# Patient Record
Sex: MALE | Race: WHITE | NOT HISPANIC OR LATINO | Employment: FULL TIME | URBAN - METROPOLITAN AREA
[De-identification: names, ages, dates, MRNs, and addresses within clinical notes are randomized per-mention and may not be internally consistent; named-entity substitution may affect disease eponyms.]

---

## 2020-11-25 ENCOUNTER — TELEMEDICINE (OUTPATIENT)
Dept: FAMILY MEDICINE CLINIC | Facility: CLINIC | Age: 50
End: 2020-11-25

## 2020-11-25 DIAGNOSIS — I10 BENIGN ESSENTIAL HTN: Primary | ICD-10-CM

## 2020-11-25 DIAGNOSIS — Z00.00 LABORATORY EXAMINATION ORDERED AS PART OF A ROUTINE GENERAL MEDICAL EXAMINATION: ICD-10-CM

## 2020-11-25 PROCEDURE — 99203 OFFICE O/P NEW LOW 30 MIN: CPT | Performed by: PHYSICIAN ASSISTANT

## 2020-11-25 RX ORDER — AMLODIPINE BESYLATE 5 MG/1
TABLET ORAL
Qty: 30 TABLET | Refills: 0 | Status: SHIPPED | OUTPATIENT
Start: 2020-11-25 | End: 2020-12-18 | Stop reason: SDUPTHER

## 2020-11-25 NOTE — PROGRESS NOTES
Subjective   Juan M Collins is a 50 y.o. male.   You have chosen to receive care through a telehealth visit.  Do you consent to use a video/audio connection for your medical care today? Yes    History of Present Illness   Juan M Collins 50 y.o. male who presents today for a new patient appointment.    he has a history of There is no problem list on file for this patient.  .  he is here to establish care I reviewed the PFSH recorded today by my MA/LPN staff.   he   He has been feeling tired.  BP has been high for years  + COVID 19---concern change in vision and some h/a;   Test was last Thurs 11-19-20; onset 11-14-20  Fever resolved.   Cough is better; no wheezing or SOA    I cannot check bp d/t COVID +  I see his wife  Situational anxiety  Dad had MM  Mom DM  See if ins covers colon cancer screening.  The following portions of the patient's history were reviewed and updated as appropriate: allergies, current medications, past family history, past medical history, past social history, past surgical history and problem list.    Review of Systems   Constitutional: Negative for activity change, appetite change and unexpected weight change.   HENT: Negative for nosebleeds and trouble swallowing.    Eyes: Positive for visual disturbance. Negative for pain.   Respiratory: Negative for chest tightness, shortness of breath and wheezing.    Cardiovascular: Negative for chest pain and palpitations.   Gastrointestinal: Negative for abdominal pain and blood in stool.   Endocrine: Negative.    Genitourinary: Negative for difficulty urinating and hematuria.   Musculoskeletal: Negative for joint swelling.   Skin: Negative for color change and rash.   Allergic/Immunologic: Negative.    Neurological: Negative for syncope and speech difficulty.   Hematological: Negative for adenopathy.   Psychiatric/Behavioral: Negative for agitation and confusion.   All other systems reviewed and are negative.      Objective   Physical Exam  Vitals  signs and nursing note reviewed.   Constitutional:       General: He is not in acute distress.     Appearance: He is well-developed. He is not diaphoretic.   HENT:      Head: Normocephalic.      Right Ear: External ear normal.      Left Ear: External ear normal.   Eyes:      General: No scleral icterus.     Conjunctiva/sclera: Conjunctivae normal.      Pupils: Pupils are equal, round, and reactive to light.   Neck:      Musculoskeletal: Normal range of motion and neck supple.   Cardiovascular:      Rate and Rhythm: Normal rate.      Heart sounds: No murmur.   Pulmonary:      Effort: Pulmonary effort is normal.   Musculoskeletal: Normal range of motion.   Skin:     General: Skin is warm and dry.      Coloration: Skin is not jaundiced.      Findings: No rash.   Neurological:      General: No focal deficit present.      Mental Status: He is alert and oriented to person, place, and time. Mental status is at baseline.   Psychiatric:         Mood and Affect: Mood normal. Affect is not inappropriate.         Behavior: Behavior normal.         Thought Content: Thought content normal.         Judgment: Judgment normal.         Assessment/Plan   Diagnoses and all orders for this visit:    1. Benign essential HTN (Primary)  -     Comprehensive metabolic panel  -     Lipid panel  -     CBC and Differential  -     TSH  -     Vitamin B12  -     Folate  -     Urinalysis With Microscopic - Urine, Clean Catch  -     T4, Free  -     T3, Free    2. Laboratory examination ordered as part of a routine general medical examination  -     Comprehensive metabolic panel  -     Lipid panel  -     CBC and Differential  -     TSH  -     Vitamin B12  -     Folate  -     Urinalysis With Microscopic - Urine, Clean Catch  -     T4, Free  -     T3, Free        Need to see me next Friday  Get labs  Plan, Juan M Collins, was seen today.  he was seen for HTN and will start medication.  Time spent was 17 minutes

## 2020-11-25 NOTE — PATIENT INSTRUCTIONS
"Hypertension, Adult  High blood pressure (hypertension) is when the force of blood pumping through the arteries is too strong. The arteries are the blood vessels that carry blood from the heart throughout the body. Hypertension forces the heart to work harder to pump blood and may cause arteries to become narrow or stiff. Untreated or uncontrolled hypertension can cause a heart attack, heart failure, a stroke, kidney disease, and other problems.  A blood pressure reading consists of a higher number over a lower number. Ideally, your blood pressure should be below 120/80. The first (\"top\") number is called the systolic pressure. It is a measure of the pressure in your arteries as your heart beats. The second (\"bottom\") number is called the diastolic pressure. It is a measure of the pressure in your arteries as the heart relaxes.  What are the causes?  The exact cause of this condition is not known. There are some conditions that result in or are related to high blood pressure.  What increases the risk?  Some risk factors for high blood pressure are under your control. The following factors may make you more likely to develop this condition:  · Smoking.  · Having type 2 diabetes mellitus, high cholesterol, or both.  · Not getting enough exercise or physical activity.  · Being overweight.  · Having too much fat, sugar, calories, or salt (sodium) in your diet.  · Drinking too much alcohol.  Some risk factors for high blood pressure may be difficult or impossible to change. Some of these factors include:  · Having chronic kidney disease.  · Having a family history of high blood pressure.  · Age. Risk increases with age.  · Race. You may be at higher risk if you are .  · Gender. Men are at higher risk than women before age 45. After age 65, women are at higher risk than men.  · Having obstructive sleep apnea.  · Stress.  What are the signs or symptoms?  High blood pressure may not cause symptoms. Very high " blood pressure (hypertensive crisis) may cause:  · Headache.  · Anxiety.  · Shortness of breath.  · Nosebleed.  · Nausea and vomiting.  · Vision changes.  · Severe chest pain.  · Seizures.  How is this diagnosed?  This condition is diagnosed by measuring your blood pressure while you are seated, with your arm resting on a flat surface, your legs uncrossed, and your feet flat on the floor. The cuff of the blood pressure monitor will be placed directly against the skin of your upper arm at the level of your heart. It should be measured at least twice using the same arm. Certain conditions can cause a difference in blood pressure between your right and left arms.  Certain factors can cause blood pressure readings to be lower or higher than normal for a short period of time:  · When your blood pressure is higher when you are in a health care provider's office than when you are at home, this is called white coat hypertension. Most people with this condition do not need medicines.  · When your blood pressure is higher at home than when you are in a health care provider's office, this is called masked hypertension. Most people with this condition may need medicines to control blood pressure.  If you have a high blood pressure reading during one visit or you have normal blood pressure with other risk factors, you may be asked to:  · Return on a different day to have your blood pressure checked again.  · Monitor your blood pressure at home for 1 week or longer.  If you are diagnosed with hypertension, you may have other blood or imaging tests to help your health care provider understand your overall risk for other conditions.  How is this treated?  This condition is treated by making healthy lifestyle changes, such as eating healthy foods, exercising more, and reducing your alcohol intake. Your health care provider may prescribe medicine if lifestyle changes are not enough to get your blood pressure under control, and  if:  · Your systolic blood pressure is above 130.  · Your diastolic blood pressure is above 80.  Your personal target blood pressure may vary depending on your medical conditions, your age, and other factors.  Follow these instructions at home:  Eating and drinking    · Eat a diet that is high in fiber and potassium, and low in sodium, added sugar, and fat. An example eating plan is called the DASH (Dietary Approaches to Stop Hypertension) diet. To eat this way:  ? Eat plenty of fresh fruits and vegetables. Try to fill one half of your plate at each meal with fruits and vegetables.  ? Eat whole grains, such as whole-wheat pasta, brown rice, or whole-grain bread. Fill about one fourth of your plate with whole grains.  ? Eat or drink low-fat dairy products, such as skim milk or low-fat yogurt.  ? Avoid fatty cuts of meat, processed or cured meats, and poultry with skin. Fill about one fourth of your plate with lean proteins, such as fish, chicken without skin, beans, eggs, or tofu.  ? Avoid pre-made and processed foods. These tend to be higher in sodium, added sugar, and fat.  · Reduce your daily sodium intake. Most people with hypertension should eat less than 1,500 mg of sodium a day.  · Do not drink alcohol if:  ? Your health care provider tells you not to drink.  ? You are pregnant, may be pregnant, or are planning to become pregnant.  · If you drink alcohol:  ? Limit how much you use to:  § 0-1 drink a day for women.  § 0-2 drinks a day for men.  ? Be aware of how much alcohol is in your drink. In the [place] one drink equals one 12 oz bottle of beer (355 mL), one 5 oz glass of wine (148 mL), or one 1½ oz glass of hard liquor (44 mL).  Lifestyle    · Work with your health care provider to maintain a healthy body weight or to lose weight. Ask what an ideal weight is for you.  · Get at least 30 minutes of exercise most days of the week. Activities may include walking, swimming, or biking.  · Include exercise to  strengthen your muscles (resistance exercise), such as Pilates or lifting weights, as part of your weekly exercise routine. Try to do these types of exercises for 30 minutes at least 3 days a week.  · Do not use any products that contain nicotine or tobacco, such as cigarettes, e-cigarettes, and chewing tobacco. If you need help quitting, ask your health care provider.  · Monitor your blood pressure at home as told by your health care provider.  · Keep all follow-up visits as told by your health care provider. This is important.  Medicines  · Take over-the-counter and prescription medicines only as told by your health care provider. Follow directions carefully. Blood pressure medicines must be taken as prescribed.  · Do not skip doses of blood pressure medicine. Doing this puts you at risk for problems and can make the medicine less effective.  · Ask your health care provider about side effects or reactions to medicines that you should watch for.  Contact a health care provider if you:  · Think you are having a reaction to a medicine you are taking.  · Have headaches that keep coming back (recurring).  · Feel dizzy.  · Have swelling in your ankles.  · Have trouble with your vision.  Get help right away if you:  · Develop a severe headache or confusion.  · Have unusual weakness or numbness.  · Feel faint.  · Have severe pain in your chest or abdomen.  · Vomit repeatedly.  · Have trouble breathing.  Summary  · Hypertension is when the force of blood pumping through your arteries is too strong. If this condition is not controlled, it may put you at risk for serious complications.  · Your personal target blood pressure may vary depending on your medical conditions, your age, and other factors. For most people, a normal blood pressure is less than 120/80.  · Hypertension is treated with lifestyle changes, medicines, or a combination of both. Lifestyle changes include losing weight, eating a healthy, low-sodium diet,  exercising more, and limiting alcohol.  This information is not intended to replace advice given to you by your health care provider. Make sure you discuss any questions you have with your health care provider.  Document Revised: 08/28/2019 Document Reviewed: 08/28/2019  [employer] Patient Education © 2020 [employer]

## 2020-12-01 LAB
ALBUMIN SERPL-MCNC: 4.4 G/DL (ref 3.5–5.2)
ALBUMIN/GLOB SERPL: 1.8 G/DL
ALP SERPL-CCNC: 88 U/L (ref 39–117)
ALT SERPL-CCNC: 41 U/L (ref 1–41)
APPEARANCE UR: CLEAR
AST SERPL-CCNC: 22 U/L (ref 1–40)
BACTERIA #/AREA URNS HPF: NORMAL /HPF
BASOPHILS # BLD AUTO: 0.05 10*3/MM3 (ref 0–0.2)
BASOPHILS NFR BLD AUTO: 0.9 % (ref 0–1.5)
BILIRUB SERPL-MCNC: 1.5 MG/DL (ref 0–1.2)
BILIRUB UR QL STRIP: NEGATIVE
BUN SERPL-MCNC: 8 MG/DL (ref 6–20)
BUN/CREAT SERPL: 10.8 (ref 7–25)
CALCIUM SERPL-MCNC: 9.2 MG/DL (ref 8.6–10.5)
CASTS URNS MICRO: NORMAL
CHLORIDE SERPL-SCNC: 98 MMOL/L (ref 98–107)
CHOLEST SERPL-MCNC: 202 MG/DL (ref 0–200)
CO2 SERPL-SCNC: 27.6 MMOL/L (ref 22–29)
COLOR UR: YELLOW
CREAT SERPL-MCNC: 0.74 MG/DL (ref 0.76–1.27)
CRYSTALS URNS MICRO: NORMAL
EOSINOPHIL # BLD AUTO: 0.1 10*3/MM3 (ref 0–0.4)
EOSINOPHIL NFR BLD AUTO: 1.8 % (ref 0.3–6.2)
EPI CELLS #/AREA URNS HPF: NORMAL /HPF
ERYTHROCYTE [DISTWIDTH] IN BLOOD BY AUTOMATED COUNT: 12.5 % (ref 12.3–15.4)
FOLATE SERPL-MCNC: 12.4 NG/ML (ref 4.78–24.2)
GLOBULIN SER CALC-MCNC: 2.5 GM/DL
GLUCOSE SERPL-MCNC: 295 MG/DL (ref 65–99)
GLUCOSE UR QL: ABNORMAL
HCT VFR BLD AUTO: 47.5 % (ref 37.5–51)
HDLC SERPL-MCNC: 57 MG/DL (ref 40–60)
HGB BLD-MCNC: 16.7 G/DL (ref 13–17.7)
HGB UR QL STRIP: NEGATIVE
IMM GRANULOCYTES # BLD AUTO: 0.03 10*3/MM3 (ref 0–0.05)
IMM GRANULOCYTES NFR BLD AUTO: 0.5 % (ref 0–0.5)
KETONES UR QL STRIP: NEGATIVE
LDLC SERPL CALC-MCNC: 121 MG/DL (ref 0–100)
LEUKOCYTE ESTERASE UR QL STRIP: NEGATIVE
LYMPHOCYTES # BLD AUTO: 1.67 10*3/MM3 (ref 0.7–3.1)
LYMPHOCYTES NFR BLD AUTO: 30.1 % (ref 19.6–45.3)
MCH RBC QN AUTO: 32.2 PG (ref 26.6–33)
MCHC RBC AUTO-ENTMCNC: 35.2 G/DL (ref 31.5–35.7)
MCV RBC AUTO: 91.7 FL (ref 79–97)
MONOCYTES # BLD AUTO: 0.36 10*3/MM3 (ref 0.1–0.9)
MONOCYTES NFR BLD AUTO: 6.5 % (ref 5–12)
NEUTROPHILS # BLD AUTO: 3.34 10*3/MM3 (ref 1.7–7)
NEUTROPHILS NFR BLD AUTO: 60.2 % (ref 42.7–76)
NITRITE UR QL STRIP: NEGATIVE
NRBC BLD AUTO-RTO: 0 /100 WBC (ref 0–0.2)
PH UR STRIP: 5.5 [PH] (ref 5–8)
PLATELET # BLD AUTO: 251 10*3/MM3 (ref 140–450)
POTASSIUM SERPL-SCNC: 4.3 MMOL/L (ref 3.5–5.2)
PROT SERPL-MCNC: 6.9 G/DL (ref 6–8.5)
PROT UR QL STRIP: NEGATIVE
RBC # BLD AUTO: 5.18 10*6/MM3 (ref 4.14–5.8)
RBC #/AREA URNS HPF: NORMAL /HPF
SODIUM SERPL-SCNC: 137 MMOL/L (ref 136–145)
SP GR UR: ABNORMAL (ref 1–1.03)
T3FREE SERPL-MCNC: 3.4 PG/ML (ref 2–4.4)
T4 FREE SERPL-MCNC: 1.23 NG/DL (ref 0.93–1.7)
TRIGL SERPL-MCNC: 135 MG/DL (ref 0–150)
TSH SERPL DL<=0.005 MIU/L-ACNC: 3.06 UIU/ML (ref 0.27–4.2)
UROBILINOGEN UR STRIP-MCNC: ABNORMAL MG/DL
VIT B12 SERPL-MCNC: 647 PG/ML (ref 211–946)
VLDLC SERPL CALC-MCNC: 24 MG/DL (ref 5–40)
WBC # BLD AUTO: 5.55 10*3/MM3 (ref 3.4–10.8)
WBC #/AREA URNS HPF: NORMAL /HPF

## 2020-12-03 LAB
C PEPTIDE SERPL-MCNC: 4.7 NG/ML (ref 1.1–4.4)
HBA1C MFR BLD: 10.59 % (ref 4.8–5.6)
WRITTEN AUTHORIZATION: NORMAL

## 2020-12-04 ENCOUNTER — OFFICE VISIT (OUTPATIENT)
Dept: FAMILY MEDICINE CLINIC | Facility: CLINIC | Age: 50
End: 2020-12-04

## 2020-12-04 VITALS
OXYGEN SATURATION: 98 % | DIASTOLIC BLOOD PRESSURE: 100 MMHG | WEIGHT: 207.7 LBS | RESPIRATION RATE: 16 BRPM | HEART RATE: 90 BPM | SYSTOLIC BLOOD PRESSURE: 140 MMHG | HEIGHT: 71 IN | TEMPERATURE: 97.3 F | BODY MASS INDEX: 29.08 KG/M2

## 2020-12-04 DIAGNOSIS — E11.9 NEW ONSET TYPE 2 DIABETES MELLITUS (HCC): ICD-10-CM

## 2020-12-04 DIAGNOSIS — E78.2 HYPERLIPIDEMIA, MIXED: ICD-10-CM

## 2020-12-04 DIAGNOSIS — E55.9 VITAMIN D DEFICIENCY: ICD-10-CM

## 2020-12-04 DIAGNOSIS — I10 BENIGN ESSENTIAL HTN: ICD-10-CM

## 2020-12-04 DIAGNOSIS — E11.65 TYPE 2 DIABETES MELLITUS WITH HYPERGLYCEMIA, WITHOUT LONG-TERM CURRENT USE OF INSULIN (HCC): Primary | ICD-10-CM

## 2020-12-04 PROCEDURE — 90732 PPSV23 VACC 2 YRS+ SUBQ/IM: CPT | Performed by: PHYSICIAN ASSISTANT

## 2020-12-04 PROCEDURE — 90472 IMMUNIZATION ADMIN EACH ADD: CPT | Performed by: PHYSICIAN ASSISTANT

## 2020-12-04 PROCEDURE — 99214 OFFICE O/P EST MOD 30 MIN: CPT | Performed by: PHYSICIAN ASSISTANT

## 2020-12-04 PROCEDURE — 90686 IIV4 VACC NO PRSV 0.5 ML IM: CPT | Performed by: PHYSICIAN ASSISTANT

## 2020-12-04 PROCEDURE — 90471 IMMUNIZATION ADMIN: CPT | Performed by: PHYSICIAN ASSISTANT

## 2020-12-04 RX ORDER — LISINOPRIL 20 MG/1
20 TABLET ORAL DAILY
Qty: 90 TABLET | Refills: 0 | Status: SHIPPED | OUTPATIENT
Start: 2020-12-04 | End: 2020-12-18 | Stop reason: SDUPTHER

## 2020-12-04 RX ORDER — ROSUVASTATIN CALCIUM 10 MG/1
10 TABLET, COATED ORAL DAILY
Qty: 90 TABLET | Refills: 3 | Status: SHIPPED | OUTPATIENT
Start: 2020-12-04 | End: 2021-02-25 | Stop reason: SDUPTHER

## 2020-12-04 RX ORDER — BLOOD-GLUCOSE METER
1 KIT MISCELLANEOUS DAILY
Qty: 1 EACH | Refills: 0 | Status: SHIPPED | OUTPATIENT
Start: 2020-12-04

## 2020-12-04 RX ORDER — METFORMIN HYDROCHLORIDE 500 MG/1
TABLET, EXTENDED RELEASE ORAL
Qty: 120 TABLET | Refills: 5 | Status: SHIPPED | OUTPATIENT
Start: 2020-12-04 | End: 2022-02-17 | Stop reason: SDUPTHER

## 2020-12-04 NOTE — PATIENT INSTRUCTIONS
Diabetes Mellitus and Nutrition, Adult  When you have diabetes (diabetes mellitus), it is very important to have healthy eating habits because your blood sugar (glucose) levels are greatly affected by what you eat and drink. Eating healthy foods in the appropriate amounts, at about the same times every day, can help you:  · Control your blood glucose.  · Lower your risk of heart disease.  · Improve your blood pressure.  · Reach or maintain a healthy weight.  Every person with diabetes is different, and each person has different needs for a meal plan. Your health care provider may recommend that you work with a diet and nutrition specialist (dietitian) to make a meal plan that is best for you. Your meal plan may vary depending on factors such as:  · The calories you need.  · The medicines you take.  · Your weight.  · Your blood glucose, blood pressure, and cholesterol levels.  · Your activity level.  · Other health conditions you have, such as heart or kidney disease.  How do carbohydrates affect me?  Carbohydrates, also called carbs, affect your blood glucose level more than any other type of food. Eating carbs naturally raises the amount of glucose in your blood. Carb counting is a method for keeping track of how many carbs you eat. Counting carbs is important to keep your blood glucose at a healthy level, especially if you use insulin or take certain oral diabetes medicines.  It is important to know how many carbs you can safely have in each meal. This is different for every person. Your dietitian can help you calculate how many carbs you should have at each meal and for each snack.  Foods that contain carbs include:  · Bread, cereal, rice, pasta, and crackers.  · Potatoes and corn.  · Peas, beans, and lentils.  · Milk and yogurt.  · Fruit and juice.  · Desserts, such as cakes, cookies, ice cream, and candy.  How does alcohol affect me?  Alcohol can cause a sudden decrease in blood glucose (hypoglycemia),  "especially if you use insulin or take certain oral diabetes medicines. Hypoglycemia can be a life-threatening condition. Symptoms of hypoglycemia (sleepiness, dizziness, and confusion) are similar to symptoms of having too much alcohol.  If your health care provider says that alcohol is safe for you, follow these guidelines:  · Limit alcohol intake to no more than 1 drink per day for nonpregnant women and 2 drinks per day for men. One drink equals 12 oz of beer, 5 oz of wine, or 1½ oz of hard liquor.  · Do not drink on an empty stomach.  · Keep yourself hydrated with water, diet soda, or unsweetened iced tea.  · Keep in mind that regular soda, juice, and other mixers may contain a lot of sugar and must be counted as carbs.  What are tips for following this plan?    Reading food labels  · Start by checking the serving size on the \"Nutrition Facts\" label of packaged foods and drinks. The amount of calories, carbs, fats, and other nutrients listed on the label is based on one serving of the item. Many items contain more than one serving per package.  · Check the total grams (g) of carbs in one serving. You can calculate the number of servings of carbs in one serving by dividing the total carbs by 15. For example, if a food has 30 g of total carbs, it would be equal to 2 servings of carbs.  · Check the number of grams (g) of saturated and trans fats in one serving. Choose foods that have low or no amount of these fats.  · Check the number of milligrams (mg) of salt (sodium) in one serving. Most people should limit total sodium intake to less than 2,300 mg per day.  · Always check the nutrition information of foods labeled as \"low-fat\" or \"nonfat\". These foods may be higher in added sugar or refined carbs and should be avoided.  · Talk to your dietitian to identify your daily goals for nutrients listed on the label.  Shopping  · Avoid buying canned, premade, or processed foods. These foods tend to be high in fat, sodium, " and added sugar.  · Shop around the outside edge of the grocery store. This includes fresh fruits and vegetables, bulk grains, fresh meats, and fresh dairy.  Cooking  · Use low-heat cooking methods, such as baking, instead of high-heat cooking methods like deep frying.  · Cook using healthy oils, such as olive, canola, or sunflower oil.  · Avoid cooking with butter, cream, or high-fat meats.  Meal planning  · Eat meals and snacks regularly, preferably at the same times every day. Avoid going long periods of time without eating.  · Eat foods high in fiber, such as fresh fruits, vegetables, beans, and whole grains. Talk to your dietitian about how many servings of carbs you can eat at each meal.  · Eat 4-6 ounces (oz) of lean protein each day, such as lean meat, chicken, fish, eggs, or tofu. One oz of lean protein is equal to:  ? 1 oz of meat, chicken, or fish.  ? 1 egg.  ? ¼ cup of tofu.  · Eat some foods each day that contain healthy fats, such as avocado, nuts, seeds, and fish.  Lifestyle  · Check your blood glucose regularly.  · Exercise regularly as told by your health care provider. This may include:  ? 150 minutes of moderate-intensity or vigorous-intensity exercise each week. This could be brisk walking, biking, or water aerobics.  ? Stretching and doing strength exercises, such as yoga or weightlifting, at least 2 times a week.  · Take medicines as told by your health care provider.  · Do not use any products that contain nicotine or tobacco, such as cigarettes and e-cigarettes. If you need help quitting, ask your health care provider.  · Work with a counselor or diabetes educator to identify strategies to manage stress and any emotional and social challenges.  Questions to ask a health care provider  · Do I need to meet with a diabetes educator?  · Do I need to meet with a dietitian?  · What number can I call if I have questions?  · When are the best times to check my blood glucose?  Where to find more  "information:  · American Diabetes Association: diabetes.org  · Academy of Nutrition and Dietetics: www.eatright.org  · National Java of Diabetes and Digestive and Kidney Diseases (NIH): www.niddk.nih.gov  Summary  · A healthy meal plan will help you control your blood glucose and maintain a healthy lifestyle.  · Working with a diet and nutrition specialist (dietitian) can help you make a meal plan that is best for you.  · Keep in mind that carbohydrates (carbs) and alcohol have immediate effects on your blood glucose levels. It is important to count carbs and to use alcohol carefully.  This information is not intended to replace advice given to you by your health care provider. Make sure you discuss any questions you have with your health care provider.  Document Revised: 11/30/2018 Document Reviewed: 01/22/2018  [employer] Patient Education © 2020 [employer]      Hypertension, Adult  High blood pressure (hypertension) is when the force of blood pumping through the arteries is too strong. The arteries are the blood vessels that carry blood from the heart throughout the body. Hypertension forces the heart to work harder to pump blood and may cause arteries to become narrow or stiff. Untreated or uncontrolled hypertension can cause a heart attack, heart failure, a stroke, kidney disease, and other problems.  A blood pressure reading consists of a higher number over a lower number. Ideally, your blood pressure should be below 120/80. The first (\"top\") number is called the systolic pressure. It is a measure of the pressure in your arteries as your heart beats. The second (\"bottom\") number is called the diastolic pressure. It is a measure of the pressure in your arteries as the heart relaxes.  What are the causes?  The exact cause of this condition is not known. There are some conditions that result in or are related to high blood pressure.  What increases the risk?  Some risk factors for high blood pressure are " under your control. The following factors may make you more likely to develop this condition:  · Smoking.  · Having type 2 diabetes mellitus, high cholesterol, or both.  · Not getting enough exercise or physical activity.  · Being overweight.  · Having too much fat, sugar, calories, or salt (sodium) in your diet.  · Drinking too much alcohol.  Some risk factors for high blood pressure may be difficult or impossible to change. Some of these factors include:  · Having chronic kidney disease.  · Having a family history of high blood pressure.  · Age. Risk increases with age.  · Race. You may be at higher risk if you are .  · Gender. Men are at higher risk than women before age 45. After age 65, women are at higher risk than men.  · Having obstructive sleep apnea.  · Stress.  What are the signs or symptoms?  High blood pressure may not cause symptoms. Very high blood pressure (hypertensive crisis) may cause:  · Headache.  · Anxiety.  · Shortness of breath.  · Nosebleed.  · Nausea and vomiting.  · Vision changes.  · Severe chest pain.  · Seizures.  How is this diagnosed?  This condition is diagnosed by measuring your blood pressure while you are seated, with your arm resting on a flat surface, your legs uncrossed, and your feet flat on the floor. The cuff of the blood pressure monitor will be placed directly against the skin of your upper arm at the level of your heart. It should be measured at least twice using the same arm. Certain conditions can cause a difference in blood pressure between your right and left arms.  Certain factors can cause blood pressure readings to be lower or higher than normal for a short period of time:  · When your blood pressure is higher when you are in a health care provider's office than when you are at home, this is called white coat hypertension. Most people with this condition do not need medicines.  · When your blood pressure is higher at home than when you are in a  health care provider's office, this is called masked hypertension. Most people with this condition may need medicines to control blood pressure.  If you have a high blood pressure reading during one visit or you have normal blood pressure with other risk factors, you may be asked to:  · Return on a different day to have your blood pressure checked again.  · Monitor your blood pressure at home for 1 week or longer.  If you are diagnosed with hypertension, you may have other blood or imaging tests to help your health care provider understand your overall risk for other conditions.  How is this treated?  This condition is treated by making healthy lifestyle changes, such as eating healthy foods, exercising more, and reducing your alcohol intake. Your health care provider may prescribe medicine if lifestyle changes are not enough to get your blood pressure under control, and if:  · Your systolic blood pressure is above 130.  · Your diastolic blood pressure is above 80.  Your personal target blood pressure may vary depending on your medical conditions, your age, and other factors.  Follow these instructions at home:  Eating and drinking    · Eat a diet that is high in fiber and potassium, and low in sodium, added sugar, and fat. An example eating plan is called the DASH (Dietary Approaches to Stop Hypertension) diet. To eat this way:  ? Eat plenty of fresh fruits and vegetables. Try to fill one half of your plate at each meal with fruits and vegetables.  ? Eat whole grains, such as whole-wheat pasta, brown rice, or whole-grain bread. Fill about one fourth of your plate with whole grains.  ? Eat or drink low-fat dairy products, such as skim milk or low-fat yogurt.  ? Avoid fatty cuts of meat, processed or cured meats, and poultry with skin. Fill about one fourth of your plate with lean proteins, such as fish, chicken without skin, beans, eggs, or tofu.  ? Avoid pre-made and processed foods. These tend to be higher in  sodium, added sugar, and fat.  · Reduce your daily sodium intake. Most people with hypertension should eat less than 1,500 mg of sodium a day.  · Do not drink alcohol if:  ? Your health care provider tells you not to drink.  ? You are pregnant, may be pregnant, or are planning to become pregnant.  · If you drink alcohol:  ? Limit how much you use to:  § 0-1 drink a day for women.  § 0-2 drinks a day for men.  ? Be aware of how much alcohol is in your drink. In the [place] one drink equals one 12 oz bottle of beer (355 mL), one 5 oz glass of wine (148 mL), or one 1½ oz glass of hard liquor (44 mL).  Lifestyle    · Work with your health care provider to maintain a healthy body weight or to lose weight. Ask what an ideal weight is for you.  · Get at least 30 minutes of exercise most days of the week. Activities may include walking, swimming, or biking.  · Include exercise to strengthen your muscles (resistance exercise), such as Pilates or lifting weights, as part of your weekly exercise routine. Try to do these types of exercises for 30 minutes at least 3 days a week.  · Do not use any products that contain nicotine or tobacco, such as cigarettes, e-cigarettes, and chewing tobacco. If you need help quitting, ask your health care provider.  · Monitor your blood pressure at home as told by your health care provider.  · Keep all follow-up visits as told by your health care provider. This is important.  Medicines  · Take over-the-counter and prescription medicines only as told by your health care provider. Follow directions carefully. Blood pressure medicines must be taken as prescribed.  · Do not skip doses of blood pressure medicine. Doing this puts you at risk for problems and can make the medicine less effective.  · Ask your health care provider about side effects or reactions to medicines that you should watch for.  Contact a health care provider if you:  · Think you are having a reaction to a medicine you are  taking.  · Have headaches that keep coming back (recurring).  · Feel dizzy.  · Have swelling in your ankles.  · Have trouble with your vision.  Get help right away if you:  · Develop a severe headache or confusion.  · Have unusual weakness or numbness.  · Feel faint.  · Have severe pain in your chest or abdomen.  · Vomit repeatedly.  · Have trouble breathing.  Summary  · Hypertension is when the force of blood pumping through your arteries is too strong. If this condition is not controlled, it may put you at risk for serious complications.  · Your personal target blood pressure may vary depending on your medical conditions, your age, and other factors. For most people, a normal blood pressure is less than 120/80.  · Hypertension is treated with lifestyle changes, medicines, or a combination of both. Lifestyle changes include losing weight, eating a healthy, low-sodium diet, exercising more, and limiting alcohol.  This information is not intended to replace advice given to you by your health care provider. Make sure you discuss any questions you have with your health care provider.  Document Revised: 08/28/2019 Document Reviewed: 08/28/2019  [employer] Patient Education © 2020 [employer]

## 2020-12-04 NOTE — PROGRESS NOTES
"Subjective   Juan M Collins is a 50 y.o. male.     History of Present Illness    Since the last visit, he has overall felt fairly well.  He has Essential Hypertension not at goal, plan to add/adjust medication, New diagnosis of DMII and plan to start medication with diet and exercise. Went over need to have yearly DM eye exam and copy me on this, suggest diabetes educator and dietician.  I have reviewed lab goals and Vitamin D deficiency and will update labs for continued management.  he has been compliant with current medications have reviewed them.  The patient denies medication side effects.  Will refill medications. BP (!) 142/109 (BP Location: Left arm, Patient Position: Sitting, Cuff Size: Adult)   Pulse 90   Temp 97.3 °F (36.3 °C)   Resp 16   Ht 180.3 cm (71\")   Wt 94.2 kg (207 lb 11.2 oz)   SpO2 98%   BMI 28.97 kg/m²   No concerns for depression or sleep apnea.  Talked about the appointment for diet and exercise.  Results for orders placed or performed in visit on 11/25/20   Comprehensive metabolic panel    Specimen: Blood   Result Value Ref Range    Glucose 295 (H) 65 - 99 mg/dL    BUN 8 6 - 20 mg/dL    Creatinine 0.74 (L) 0.76 - 1.27 mg/dL    eGFR Non African Am 113 >60 mL/min/1.73    eGFR African Am 137 >60 mL/min/1.73    BUN/Creatinine Ratio 10.8 7.0 - 25.0    Sodium 137 136 - 145 mmol/L    Potassium 4.3 3.5 - 5.2 mmol/L    Chloride 98 98 - 107 mmol/L    Total CO2 27.6 22.0 - 29.0 mmol/L    Calcium 9.2 8.6 - 10.5 mg/dL    Total Protein 6.9 6.0 - 8.5 g/dL    Albumin 4.40 3.50 - 5.20 g/dL    Globulin 2.5 gm/dL    A/G Ratio 1.8 g/dL    Total Bilirubin 1.5 (H) 0.0 - 1.2 mg/dL    Alkaline Phosphatase 88 39 - 117 U/L    AST (SGOT) 22 1 - 40 U/L    ALT (SGPT) 41 1 - 41 U/L   Lipid panel    Specimen: Blood   Result Value Ref Range    Total Cholesterol 202 (H) 0 - 200 mg/dL    Triglycerides 135 0 - 150 mg/dL    HDL Cholesterol 57 40 - 60 mg/dL    VLDL Cholesterol Delbert 24 5 - 40 mg/dL    LDL Chol Calc ([facility]) " 121 (H) 0 - 100 mg/dL   TSH    Specimen: Blood   Result Value Ref Range    TSH 3.060 0.270 - 4.200 uIU/mL   Vitamin B12    Specimen: Blood   Result Value Ref Range    Vitamin B-12 647 211 - 946 pg/mL   Folate    Specimen: Blood   Result Value Ref Range    Folate 12.40 4.78 - 24.20 ng/mL   T4, Free    Specimen: Blood   Result Value Ref Range    Free T4 1.23 0.93 - 1.70 ng/dL   T3, Free    Specimen: Blood   Result Value Ref Range    T3, Free 3.4 2.0 - 4.4 pg/mL   Microscopic Examination -   Result Value Ref Range    WBC, UA 0-2 /HPF    RBC, UA Comment /HPF    Epithelial Cells (non renal) 0-2 /HPF    Cast Type Comment     Crystal Type Comment     Bacteria, UA Comment None Seen /HPF   Hemoglobin A1c   Result Value Ref Range    Hemoglobin A1C 10.59 (H) 4.80 - 5.60 %   C-Peptide   Result Value Ref Range    C-Peptide 4.7 (H) 1.1 - 4.4 ng/mL   Written Authorization   Result Value Ref Range    Written Authorization Comment    CBC and Differential    Specimen: Blood   Result Value Ref Range    WBC 5.55 3.40 - 10.80 10*3/mm3    RBC 5.18 4.14 - 5.80 10*6/mm3    Hemoglobin 16.7 13.0 - 17.7 g/dL    Hematocrit 47.5 37.5 - 51.0 %    MCV 91.7 79.0 - 97.0 fL    MCH 32.2 26.6 - 33.0 pg    MCHC 35.2 31.5 - 35.7 g/dL    RDW 12.5 12.3 - 15.4 %    Platelets 251 140 - 450 10*3/mm3    Neutrophil Rel % 60.2 42.7 - 76.0 %    Lymphocyte Rel % 30.1 19.6 - 45.3 %    Monocyte Rel % 6.5 5.0 - 12.0 %    Eosinophil Rel % 1.8 0.3 - 6.2 %    Basophil Rel % 0.9 0.0 - 1.5 %    Neutrophils Absolute 3.34 1.70 - 7.00 10*3/mm3    Lymphocytes Absolute 1.67 0.70 - 3.10 10*3/mm3    Monocytes Absolute 0.36 0.10 - 0.90 10*3/mm3    Eosinophils Absolute 0.10 0.00 - 0.40 10*3/mm3    Basophils Absolute 0.05 0.00 - 0.20 10*3/mm3    Immature Granulocyte Rel % 0.5 0.0 - 0.5 %    Immature Grans Absolute 0.03 0.00 - 0.05 10*3/mm3    nRBC 0.0 0.0 - 0.2 /100 WBC   Urinalysis With Microscopic - Urine, Clean Catch    Specimen: Urine, Clean Catch   Result Value Ref Range     Specific Gravity, UA Comment 1.005 - 1.030    pH, UA 5.5 5.0 - 8.0    Color, UA Yellow     Appearance, UA Clear Clear    Leukocytes, UA Negative Negative    Protein Negative Negative    Glucose, UA See below: (A) Negative    Ketones Negative Negative    Blood, UA Negative Negative    Bilirubin, UA Negative Negative    Urobilinogen, UA Comment     Nitrite, UA Negative Negative     Watching bilirubin  C-peptide also added on to lab work due to need to determine if patient was still making insulin and correction of medications.  Mom has DMII  I started him on bp med  157/115  145/112  159/111  178/120    The following portions of the patient's history were reviewed and updated as appropriate: allergies, current medications, past family history, past medical history, past social history, past surgical history and problem list.    Review of Systems   Constitutional: Negative for activity change, appetite change, fatigue and fever.   HENT: Negative for nosebleeds and trouble swallowing.    Eyes: Negative for visual disturbance.   Respiratory: Negative for choking, shortness of breath and stridor.    Cardiovascular: Negative for chest pain.   Gastrointestinal: Negative for blood in stool.   Endocrine: Negative for polydipsia.   Genitourinary: Negative for genital sores and hematuria.   Musculoskeletal: Negative for joint swelling.   Skin: Negative for color change and rash.   Allergic/Immunologic: Negative for immunocompromised state.   Neurological: Negative for seizures, facial asymmetry and speech difficulty.   Hematological: Negative for adenopathy.   Psychiatric/Behavioral: Negative for behavioral problems, dysphoric mood, self-injury and suicidal ideas.       Objective   Physical Exam  Vitals signs and nursing note reviewed.   Constitutional:       General: He is not in acute distress.     Appearance: He is well-developed. He is not ill-appearing, toxic-appearing or diaphoretic.   HENT:      Head: Normocephalic.       Right Ear: External ear normal.      Left Ear: External ear normal.   Eyes:      Conjunctiva/sclera: Conjunctivae normal.      Pupils: Pupils are equal, round, and reactive to light.   Neck:      Musculoskeletal: Normal range of motion and neck supple.   Cardiovascular:      Rate and Rhythm: Normal rate and regular rhythm.      Pulses: Normal pulses.      Heart sounds: Normal heart sounds. No murmur.   Pulmonary:      Effort: Pulmonary effort is normal.      Breath sounds: No rhonchi or rales.   Musculoskeletal: Normal range of motion.      Right lower leg: No edema.      Left lower leg: No edema.   Skin:     General: Skin is warm and dry.      Coloration: Skin is not jaundiced.      Findings: No rash.   Neurological:      General: No focal deficit present.      Mental Status: He is alert and oriented to person, place, and time.      Motor: No weakness.      Coordination: Coordination normal.   Psychiatric:         Mood and Affect: Mood normal. Affect is not inappropriate.         Behavior: Behavior normal.         Thought Content: Thought content normal.         Judgment: Judgment normal.         Assessment/Plan   Diagnoses and all orders for this visit:    1. Type 2 diabetes mellitus with hyperglycemia, without long-term current use of insulin (CMS/[facility]) (Primary)  -     Comprehensive Metabolic Panel; Future    2. Benign essential HTN  -     Comprehensive Metabolic Panel; Future    3. Vitamin D deficiency  -     Comprehensive Metabolic Panel; Future    Other orders  -     Semaglutide,0.25 or 0.5MG/DOS, (OZEMPIC) 2 MG/1.5ML solution pen-injector; .25mg inject subcutaneously once a week X4 then increase to 0.5mg Q weekly for DMII  Dispense: 4 pen; Refill: 0  -     Fluarix/Fluzone/Afluria Quad>6 Months  -     Pneumococcal Polysaccharide Vaccine 23-Valent Greater Than or Equal To 3yo Subcutaneous / IM  -     rosuvastatin (Crestor) 10 MG tablet; Take 1 tablet by mouth Daily. For cholesterol with Co Q 10  Dispense: 90  tablet; Refill: 3  -     metFORMIN ER (GLUCOPHAGE-XR) 500 MG 24 hr tablet; Start with 1 PO daily with food for DMII and titrate up to 4 PO daily  Dispense: 120 tablet; Refill: 5  -     lisinopril (PRINIVIL,ZESTRIL) 20 MG tablet; Take 1 tablet by mouth Daily. For BP---with Amlodipine  Dispense: 90 tablet; Refill: 0  -     glucose monitor monitoring kit; 1 each Daily. Test glucose daily and PRN for DMII E11.65  Dispense: 1 each; Refill: 0  -     glucose blood test strip; Check glucose daily and PRN  for E11.65  Dispense: 90 each; Refill: 12      Very complex decision making with new onset of diabetes and was just diagnosed with essential hypertension by video visit weeks ago.  Patient A1c is above 10 and will have to start to medication or more to achieve the goal of A1c of less than 7.  Much time spent on talking about the different types the medication and their mechanism of action and need for statin therapy.  Diabetes along with foot care and eye exam yearly.  Blood pressures not controlled and will add ACE inhibitor to amlodipine with blood pressure check in 2 weeks.  Labs to check glucose and renal function in 1 month.  Pneumovax 23, flu shot; next time Tdap  Get eye exam  Refer DM ed  Labs 1 mo  Carisa WYATT ed pt on how to do subcutaneous Ozempic today.  We will also send pharmacy glucometer and test strips for patient to check glucose daily and as needed and record.

## 2020-12-04 NOTE — PROGRESS NOTES
Immunization  Immunization performed in by Nora Roman MA. Patient tolerated the procedure well without complications.  12/04/20   Nora Roman MA

## 2020-12-18 ENCOUNTER — RESULTS ENCOUNTER (OUTPATIENT)
Dept: FAMILY MEDICINE CLINIC | Facility: CLINIC | Age: 50
End: 2020-12-18

## 2020-12-18 ENCOUNTER — OFFICE VISIT (OUTPATIENT)
Dept: FAMILY MEDICINE CLINIC | Facility: CLINIC | Age: 50
End: 2020-12-18

## 2020-12-18 VITALS
BODY MASS INDEX: 28.5 KG/M2 | WEIGHT: 203.6 LBS | TEMPERATURE: 97.5 F | DIASTOLIC BLOOD PRESSURE: 72 MMHG | RESPIRATION RATE: 16 BRPM | HEIGHT: 71 IN | SYSTOLIC BLOOD PRESSURE: 102 MMHG | OXYGEN SATURATION: 100 % | HEART RATE: 78 BPM

## 2020-12-18 DIAGNOSIS — R79.89 LFT ELEVATION: ICD-10-CM

## 2020-12-18 DIAGNOSIS — I10 BENIGN ESSENTIAL HTN: ICD-10-CM

## 2020-12-18 DIAGNOSIS — Z12.11 SCREEN FOR COLON CANCER: ICD-10-CM

## 2020-12-18 DIAGNOSIS — E55.9 VITAMIN D DEFICIENCY: ICD-10-CM

## 2020-12-18 DIAGNOSIS — E78.2 HYPERLIPIDEMIA, MIXED: ICD-10-CM

## 2020-12-18 DIAGNOSIS — E11.65 TYPE 2 DIABETES MELLITUS WITH HYPERGLYCEMIA, WITHOUT LONG-TERM CURRENT USE OF INSULIN (HCC): Primary | ICD-10-CM

## 2020-12-18 PROCEDURE — 90715 TDAP VACCINE 7 YRS/> IM: CPT | Performed by: PHYSICIAN ASSISTANT

## 2020-12-18 PROCEDURE — 99214 OFFICE O/P EST MOD 30 MIN: CPT | Performed by: PHYSICIAN ASSISTANT

## 2020-12-18 PROCEDURE — 90471 IMMUNIZATION ADMIN: CPT | Performed by: PHYSICIAN ASSISTANT

## 2020-12-18 RX ORDER — BLOOD-GLUCOSE METER
EACH MISCELLANEOUS
COMMUNITY
Start: 2020-12-04

## 2020-12-18 RX ORDER — SEMAGLUTIDE 1.34 MG/ML
INJECTION, SOLUTION SUBCUTANEOUS
Qty: 4 PEN | Refills: 5 | Status: SHIPPED | OUTPATIENT
Start: 2020-12-18 | End: 2020-12-24

## 2020-12-18 RX ORDER — AMLODIPINE BESYLATE 5 MG/1
TABLET ORAL
Qty: 90 TABLET | Refills: 1 | Status: SHIPPED | OUTPATIENT
Start: 2020-12-18 | End: 2021-02-18

## 2020-12-18 RX ORDER — LISINOPRIL 20 MG/1
20 TABLET ORAL DAILY
Qty: 90 TABLET | Refills: 0 | Status: SHIPPED | OUTPATIENT
Start: 2020-12-18 | End: 2021-02-18

## 2020-12-18 NOTE — PATIENT INSTRUCTIONS

## 2020-12-18 NOTE — PROGRESS NOTES
"Subjective   Juan M Collins is a 50 y.o. male.     History of Present Illness    Since the last visit, he has overall felt well.  He has Essential Hypertension and well controlled on current medication, Vitamin D deficiency and will update labs for continued management and DMII will get labs today and home glucose <150; lipids--will get f/u labs end Feb.  he has been compliant with current medications have reviewed them.  The patient denies medication side effects.  Will refill medications. /72   Pulse 78   Temp 97.5 °F (36.4 °C)   Resp 16   Ht 180.3 cm (70.98\")   Wt 92.4 kg (203 lb 9.6 oz)   SpO2 100%   BMI 28.41 kg/m²   Has DMII classes;  Highest glucose 150s; weight down 4 lbs    Results for orders placed or performed in visit on 11/25/20   Comprehensive metabolic panel    Specimen: Blood   Result Value Ref Range    Glucose 295 (H) 65 - 99 mg/dL    BUN 8 6 - 20 mg/dL    Creatinine 0.74 (L) 0.76 - 1.27 mg/dL    eGFR Non African Am 113 >60 mL/min/1.73    eGFR African Am 137 >60 mL/min/1.73    BUN/Creatinine Ratio 10.8 7.0 - 25.0    Sodium 137 136 - 145 mmol/L    Potassium 4.3 3.5 - 5.2 mmol/L    Chloride 98 98 - 107 mmol/L    Total CO2 27.6 22.0 - 29.0 mmol/L    Calcium 9.2 8.6 - 10.5 mg/dL    Total Protein 6.9 6.0 - 8.5 g/dL    Albumin 4.40 3.50 - 5.20 g/dL    Globulin 2.5 gm/dL    A/G Ratio 1.8 g/dL    Total Bilirubin 1.5 (H) 0.0 - 1.2 mg/dL    Alkaline Phosphatase 88 39 - 117 U/L    AST (SGOT) 22 1 - 40 U/L    ALT (SGPT) 41 1 - 41 U/L   Lipid panel    Specimen: Blood   Result Value Ref Range    Total Cholesterol 202 (H) 0 - 200 mg/dL    Triglycerides 135 0 - 150 mg/dL    HDL Cholesterol 57 40 - 60 mg/dL    VLDL Cholesterol Dlebert 24 5 - 40 mg/dL    LDL Chol Calc ([facility]) 121 (H) 0 - 100 mg/dL   TSH    Specimen: Blood   Result Value Ref Range    TSH 3.060 0.270 - 4.200 uIU/mL   Vitamin B12    Specimen: Blood   Result Value Ref Range    Vitamin B-12 647 211 - 946 pg/mL   Folate    Specimen: Blood   Result " Value Ref Range    Folate 12.40 4.78 - 24.20 ng/mL   T4, Free    Specimen: Blood   Result Value Ref Range    Free T4 1.23 0.93 - 1.70 ng/dL   T3, Free    Specimen: Blood   Result Value Ref Range    T3, Free 3.4 2.0 - 4.4 pg/mL   Microscopic Examination -   Result Value Ref Range    WBC, UA 0-2 /HPF    RBC, UA Comment /HPF    Epithelial Cells (non renal) 0-2 /HPF    Cast Type Comment     Crystal Type Comment     Bacteria, UA Comment None Seen /HPF   Hemoglobin A1c   Result Value Ref Range    Hemoglobin A1C 10.59 (H) 4.80 - 5.60 %   C-Peptide   Result Value Ref Range    C-Peptide 4.7 (H) 1.1 - 4.4 ng/mL   Written Authorization   Result Value Ref Range    Written Authorization Comment    CBC and Differential    Specimen: Blood   Result Value Ref Range    WBC 5.55 3.40 - 10.80 10*3/mm3    RBC 5.18 4.14 - 5.80 10*6/mm3    Hemoglobin 16.7 13.0 - 17.7 g/dL    Hematocrit 47.5 37.5 - 51.0 %    MCV 91.7 79.0 - 97.0 fL    MCH 32.2 26.6 - 33.0 pg    MCHC 35.2 31.5 - 35.7 g/dL    RDW 12.5 12.3 - 15.4 %    Platelets 251 140 - 450 10*3/mm3    Neutrophil Rel % 60.2 42.7 - 76.0 %    Lymphocyte Rel % 30.1 19.6 - 45.3 %    Monocyte Rel % 6.5 5.0 - 12.0 %    Eosinophil Rel % 1.8 0.3 - 6.2 %    Basophil Rel % 0.9 0.0 - 1.5 %    Neutrophils Absolute 3.34 1.70 - 7.00 10*3/mm3    Lymphocytes Absolute 1.67 0.70 - 3.10 10*3/mm3    Monocytes Absolute 0.36 0.10 - 0.90 10*3/mm3    Eosinophils Absolute 0.10 0.00 - 0.40 10*3/mm3    Basophils Absolute 0.05 0.00 - 0.20 10*3/mm3    Immature Granulocyte Rel % 0.5 0.0 - 0.5 %    Immature Grans Absolute 0.03 0.00 - 0.05 10*3/mm3    nRBC 0.0 0.0 - 0.2 /100 WBC   Urinalysis With Microscopic - Urine, Clean Catch    Specimen: Urine, Clean Catch   Result Value Ref Range    Specific Gravity, UA Comment 1.005 - 1.030    pH, UA 5.5 5.0 - 8.0    Color, UA Yellow     Appearance, UA Clear Clear    Leukocytes, UA Negative Negative    Protein Negative Negative    Glucose, UA See below: (A) Negative    Ketones  Negative Negative    Blood, UA Negative Negative    Bilirubin, UA Negative Negative    Urobilinogen, UA Comment     Nitrite, UA Negative Negative   has hx YOU    Need eye exam-----    115/9  121/89   128/92  The following portions of the patient's history were reviewed and updated as appropriate: allergies, current medications, past family history, past medical history, past social history, past surgical history and problem list.    Review of Systems   Constitutional: Negative for activity change, appetite change, fatigue and unexpected weight change.   HENT: Negative for nosebleeds and trouble swallowing.    Eyes: Negative for pain and visual disturbance.   Respiratory: Negative for chest tightness, shortness of breath and wheezing.    Cardiovascular: Negative for chest pain and palpitations.   Gastrointestinal: Negative for abdominal pain and blood in stool.   Endocrine: Negative.  Negative for polydipsia, polyphagia and polyuria.   Genitourinary: Negative for difficulty urinating and hematuria.   Musculoskeletal: Negative for joint swelling.   Skin: Negative for color change, pallor and rash.   Allergic/Immunologic: Negative.    Neurological: Negative for dizziness, tremors, seizures, syncope, speech difficulty, weakness and headaches.   Hematological: Negative for adenopathy.   Psychiatric/Behavioral: Negative for agitation and confusion. The patient is not nervous/anxious.    All other systems reviewed and are negative.      Objective   Physical Exam  Vitals signs and nursing note reviewed.   Constitutional:       General: He is not in acute distress.     Appearance: Normal appearance. He is well-developed. He is not ill-appearing, toxic-appearing or diaphoretic.   HENT:      Head: Normocephalic.   Eyes:      Conjunctiva/sclera: Conjunctivae normal.      Pupils: Pupils are equal, round, and reactive to light.   Neck:      Musculoskeletal: Normal range of motion and neck supple.      Vascular: No carotid bruit.    Cardiovascular:      Rate and Rhythm: Normal rate and regular rhythm.      Pulses:           Dorsalis pedis pulses are 2+ on the right side and 2+ on the left side.        Posterior tibial pulses are 2+ on the right side and 2+ on the left side.      Heart sounds: Normal heart sounds. No murmur.   Pulmonary:      Effort: Pulmonary effort is normal.      Breath sounds: Normal breath sounds. No rales.   Musculoskeletal: Normal range of motion.      Right lower leg: No edema.      Left lower leg: No edema.      Right foot: Normal range of motion. No deformity, bunion, Charcot foot, foot drop or prominent metatarsal heads.      Left foot: Normal range of motion. No deformity, bunion, Charcot foot, foot drop or prominent metatarsal heads.   Feet:      Right foot:      Protective Sensation: 10 sites tested. 10 sites sensed.      Toenail Condition: Right toenails are normal.      Left foot:      Protective Sensation: 10 sites tested. 10 sites sensed.      Skin integrity: Dry skin present.      Toenail Condition: Left toenails are normal.      Comments: DMII monofilament testing + = all 10 areas  Skin:     General: Skin is warm and dry.      Coloration: Skin is not jaundiced.      Findings: No rash.   Neurological:      General: No focal deficit present.      Mental Status: He is alert and oriented to person, place, and time. Mental status is at baseline.   Psychiatric:         Mood and Affect: Mood normal. Affect is not inappropriate.         Behavior: Behavior normal.         Thought Content: Thought content normal.         Judgment: Judgment normal.         Assessment/Plan   Diagnoses and all orders for this visit:    1. Type 2 diabetes mellitus with hyperglycemia, without long-term current use of insulin (CMS/[facility]) (Primary)  -     Comprehensive Metabolic Panel  -     Vitamin D 25 Hydroxy    2. Benign essential HTN  -     Comprehensive Metabolic Panel  -     Vitamin D 25 Hydroxy    3. Hyperlipidemia, mixed  -      Comprehensive Metabolic Panel  -     Vitamin D 25 Hydroxy    4. Vitamin D deficiency    5. Screen for colon cancer    Other orders  -     amLODIPine (NORVASC) 5 MG tablet; One PO daily for BP  Dispense: 90 tablet; Refill: 1  -     lisinopril (PRINIVIL,ZESTRIL) 20 MG tablet; Take 1 tablet by mouth Daily. For BP---with Amlodipine  Dispense: 90 tablet; Refill: 0  -     Semaglutide,0.25 or 0.5MG/DOS, (Ozempic, 0.25 or 0.5 MG/DOSE,) 2 MG/1.5ML solution pen-injector; Inject 0.5mg subcutaneously once weekly for DMII  Dispense: 4 pen; Refill: 5  -     Tdap Vaccine Greater Than or Equal To 8yo IM        Plan, Juan M Collins, was seen today.  he was seen for HTN and continue medication, DMII and refilled medications, Hyperlipidemia and will continue current medication and Vitamin D deficiency and will update labs .  Order cologuard         Answers for HPI/ROS submitted by the patient on 12/11/2020   Diabetes problem  What is the primary reason for your visit?: Diabetes

## 2020-12-24 LAB
25(OH)D3+25(OH)D2 SERPL-MCNC: 27 NG/ML (ref 30–100)
ALBUMIN SERPL-MCNC: 4.8 G/DL (ref 3.5–5.2)
ALBUMIN/GLOB SERPL: 1.6 G/DL
ALP SERPL-CCNC: 70 U/L (ref 39–117)
ALT SERPL-CCNC: 24 U/L (ref 1–41)
AST SERPL-CCNC: 19 U/L (ref 1–40)
BILIRUB SERPL-MCNC: 2.3 MG/DL (ref 0–1.2)
BUN SERPL-MCNC: 9 MG/DL (ref 6–20)
BUN/CREAT SERPL: 12 (ref 7–25)
CALCIUM SERPL-MCNC: 9.6 MG/DL (ref 8.6–10.5)
CHLORIDE SERPL-SCNC: 104 MMOL/L (ref 98–107)
CO2 SERPL-SCNC: 26.7 MMOL/L (ref 22–29)
CREAT SERPL-MCNC: 0.75 MG/DL (ref 0.76–1.27)
GLOBULIN SER CALC-MCNC: 3 GM/DL
GLUCOSE SERPL-MCNC: 125 MG/DL (ref 65–99)
POTASSIUM SERPL-SCNC: 4.1 MMOL/L (ref 3.5–5.2)
PROT SERPL-MCNC: 7.8 G/DL (ref 6–8.5)
SODIUM SERPL-SCNC: 140 MMOL/L (ref 136–145)

## 2020-12-24 RX ORDER — SEMAGLUTIDE 1.34 MG/ML
INJECTION, SOLUTION SUBCUTANEOUS
Qty: 4 PEN | Refills: 5 | Status: CANCELLED | OUTPATIENT
Start: 2020-12-24

## 2020-12-24 RX ORDER — DULAGLUTIDE 0.75 MG/.5ML
0.75 INJECTION, SOLUTION SUBCUTANEOUS WEEKLY
Qty: 4 PEN | Refills: 11 | Status: SHIPPED | OUTPATIENT
Start: 2020-12-24 | End: 2020-12-28

## 2020-12-28 RX ORDER — SEMAGLUTIDE 1.34 MG/ML
INJECTION, SOLUTION SUBCUTANEOUS
Qty: 4 PEN | Refills: 5 | Status: SHIPPED | OUTPATIENT
Start: 2020-12-28 | End: 2021-10-21 | Stop reason: SDUPTHER

## 2021-01-06 ENCOUNTER — HOSPITAL ENCOUNTER (OUTPATIENT)
Dept: DIABETES SERVICES | Facility: HOSPITAL | Age: 51
Setting detail: RECURRING SERIES
Discharge: HOME | End: 2021-01-06

## 2021-01-06 LAB
BILIRUB DIRECT SERPL-MCNC: 0.4 MG/DL (ref 0–0.3)
BILIRUB INDIRECT SERPL-MCNC: 1.8 MG/DL
BILIRUB SERPL-MCNC: 2.2 MG/DL (ref 0–1.2)
WRITTEN AUTHORIZATION: NORMAL

## 2021-01-06 PROCEDURE — G0109 DIAB MANAGE TRN IND/GROUP: HCPCS

## 2021-01-13 ENCOUNTER — HOSPITAL ENCOUNTER (OUTPATIENT)
Dept: DIABETES SERVICES | Facility: HOSPITAL | Age: 51
Setting detail: RECURRING SERIES
Discharge: HOME | End: 2021-01-13

## 2021-01-13 PROCEDURE — G0109 DIAB MANAGE TRN IND/GROUP: HCPCS

## 2021-01-17 ENCOUNTER — HOSPITAL ENCOUNTER (OUTPATIENT)
Dept: ULTRASOUND IMAGING | Facility: HOSPITAL | Age: 51
Discharge: HOME | End: 2021-01-17
Admitting: PHYSICIAN ASSISTANT

## 2021-01-17 DIAGNOSIS — R79.89 LFT ELEVATION: ICD-10-CM

## 2021-01-17 PROCEDURE — 76705 ECHO EXAM OF ABDOMEN: CPT

## 2021-01-20 ENCOUNTER — HOSPITAL ENCOUNTER (OUTPATIENT)
Dept: DIABETES SERVICES | Facility: HOSPITAL | Age: 51
Setting detail: RECURRING SERIES
Discharge: HOME | End: 2021-01-20

## 2021-01-20 PROCEDURE — G0109 DIAB MANAGE TRN IND/GROUP: HCPCS

## 2021-02-12 LAB
ALBUMIN SERPL-MCNC: 5 G/DL (ref 3.5–5.2)
ALBUMIN/GLOB SERPL: 2.3 G/DL
ALP SERPL-CCNC: 62 U/L (ref 39–117)
ALT SERPL-CCNC: 15 U/L (ref 1–41)
AST SERPL-CCNC: 19 U/L (ref 1–40)
BILIRUB SERPL-MCNC: 2.3 MG/DL (ref 0–1.2)
BUN SERPL-MCNC: 8 MG/DL (ref 6–20)
BUN/CREAT SERPL: 8.9 (ref 7–25)
CALCIUM SERPL-MCNC: 9.7 MG/DL (ref 8.6–10.5)
CHLORIDE SERPL-SCNC: 101 MMOL/L (ref 98–107)
CHOLEST SERPL-MCNC: 113 MG/DL (ref 0–200)
CO2 SERPL-SCNC: 27.5 MMOL/L (ref 22–29)
CREAT SERPL-MCNC: 0.9 MG/DL (ref 0.76–1.27)
GLOBULIN SER CALC-MCNC: 2.2 GM/DL
GLUCOSE SERPL-MCNC: 113 MG/DL (ref 65–99)
HBA1C MFR BLD: 5.5 % (ref 4.8–5.6)
HDLC SERPL-MCNC: 49 MG/DL (ref 40–60)
LDLC SERPL CALC-MCNC: 45 MG/DL (ref 0–100)
POTASSIUM SERPL-SCNC: 4.2 MMOL/L (ref 3.5–5.2)
PROT SERPL-MCNC: 7.2 G/DL (ref 6–8.5)
SODIUM SERPL-SCNC: 141 MMOL/L (ref 136–145)
TRIGL SERPL-MCNC: 101 MG/DL (ref 0–150)
VLDLC SERPL CALC-MCNC: 19 MG/DL (ref 5–40)

## 2021-02-13 DIAGNOSIS — R79.89 LFT ELEVATION: Primary | ICD-10-CM

## 2021-02-18 ENCOUNTER — OFFICE VISIT (OUTPATIENT)
Dept: FAMILY MEDICINE CLINIC | Facility: CLINIC | Age: 51
End: 2021-02-18

## 2021-02-18 VITALS
HEART RATE: 79 BPM | OXYGEN SATURATION: 100 % | SYSTOLIC BLOOD PRESSURE: 100 MMHG | TEMPERATURE: 97.5 F | WEIGHT: 197 LBS | HEIGHT: 71 IN | RESPIRATION RATE: 16 BRPM | DIASTOLIC BLOOD PRESSURE: 69 MMHG | BODY MASS INDEX: 27.58 KG/M2

## 2021-02-18 DIAGNOSIS — E78.2 HYPERLIPIDEMIA, MIXED: Chronic | ICD-10-CM

## 2021-02-18 DIAGNOSIS — R00.0 TACHYCARDIA: ICD-10-CM

## 2021-02-18 DIAGNOSIS — E55.9 VITAMIN D DEFICIENCY: Chronic | ICD-10-CM

## 2021-02-18 DIAGNOSIS — E11.9 TYPE 2 DIABETES MELLITUS WITHOUT COMPLICATION, WITHOUT LONG-TERM CURRENT USE OF INSULIN (HCC): Primary | Chronic | ICD-10-CM

## 2021-02-18 DIAGNOSIS — I10 BENIGN ESSENTIAL HTN: Chronic | ICD-10-CM

## 2021-02-18 PROCEDURE — 99214 OFFICE O/P EST MOD 30 MIN: CPT | Performed by: PHYSICIAN ASSISTANT

## 2021-02-18 RX ORDER — LISINOPRIL 10 MG/1
10 TABLET ORAL DAILY
Qty: 90 TABLET | Refills: 1 | Status: SHIPPED | OUTPATIENT
Start: 2021-02-18 | End: 2021-02-25

## 2021-02-18 RX ORDER — METOPROLOL SUCCINATE 50 MG/1
50 TABLET, EXTENDED RELEASE ORAL DAILY
Qty: 30 TABLET | Refills: 0 | Status: SHIPPED | OUTPATIENT
Start: 2021-02-18 | End: 2021-02-25 | Stop reason: SDUPTHER

## 2021-02-18 NOTE — PROGRESS NOTES
"Subjective   Juan M Collins is a 51 y.o. male.     History of Present Illness    Since the last visit, he has overall felt well.  He has DMII well controlled on medication and will continue regimen, Hyperlipidemia with goals met with current Rx and HTN ---bp too low; stop Amlodipine; need to address chronic sinus tachycardia-----average heart rate low 100s. add Toprol and go down Lisinopril ---watch bp; f/u 1 week; d/w DR Zazueta.  he has been compliant with current medications have reviewed them.  The patient denies medication side effects.  Will refill medications. /69 (BP Location: Left arm, Patient Position: Sitting, Cuff Size: Adult)   Pulse 79   Temp 97.5 °F (36.4 °C)   Resp 16   Ht 180.3 cm (70.98\")   Wt 89.4 kg (197 lb)   SpO2 100%   BMI 27.49 kg/m²     Results for orders placed or performed in visit on 12/18/20   Comprehensive Metabolic Panel    Specimen: Blood   Result Value Ref Range    Glucose 125 (H) 65 - 99 mg/dL    BUN 9 6 - 20 mg/dL    Creatinine 0.75 (L) 0.76 - 1.27 mg/dL    eGFR Non African Am 111 >60 mL/min/1.73    eGFR African Am 135 >60 mL/min/1.73    BUN/Creatinine Ratio 12.0 7.0 - 25.0    Sodium 140 136 - 145 mmol/L    Potassium 4.1 3.5 - 5.2 mmol/L    Chloride 104 98 - 107 mmol/L    Total CO2 26.7 22.0 - 29.0 mmol/L    Calcium 9.6 8.6 - 10.5 mg/dL    Total Protein 7.8 6.0 - 8.5 g/dL    Albumin 4.80 3.50 - 5.20 g/dL    Globulin 3.0 gm/dL    A/G Ratio 1.6 g/dL    Total Bilirubin 2.3 (H) 0.0 - 1.2 mg/dL    Alkaline Phosphatase 70 39 - 117 U/L    AST (SGOT) 19 1 - 40 U/L    ALT (SGPT) 24 1 - 41 U/L   Vitamin D 25 Hydroxy    Specimen: Blood   Result Value Ref Range    25 Hydroxy, Vitamin D 27.0 (L) 30.0 - 100.0 ng/ml   Comprehensive metabolic panel    Specimen: Blood   Result Value Ref Range    Glucose 113 (H) 65 - 99 mg/dL    BUN 8 6 - 20 mg/dL    Creatinine 0.90 0.76 - 1.27 mg/dL    eGFR Non African Am 90 >60 mL/min/1.73    eGFR African Am 109 >60 mL/min/1.73    BUN/Creatinine " Ratio 8.9 7.0 - 25.0    Sodium 141 136 - 145 mmol/L    Potassium 4.2 3.5 - 5.2 mmol/L    Chloride 101 98 - 107 mmol/L    Total CO2 27.5 22.0 - 29.0 mmol/L    Calcium 9.7 8.6 - 10.5 mg/dL    Total Protein 7.2 6.0 - 8.5 g/dL    Albumin 5.00 3.50 - 5.20 g/dL    Globulin 2.2 gm/dL    A/G Ratio 2.3 g/dL    Total Bilirubin 2.3 (H) 0.0 - 1.2 mg/dL    Alkaline Phosphatase 62 39 - 117 U/L    AST (SGOT) 19 1 - 40 U/L    ALT (SGPT) 15 1 - 41 U/L   Lipid panel    Specimen: Blood   Result Value Ref Range    Total Cholesterol 113 0 - 200 mg/dL    Triglycerides 101 0 - 150 mg/dL    HDL Cholesterol 49 40 - 60 mg/dL    VLDL Cholesterol Delbert 19 5 - 40 mg/dL    LDL Chol Calc (NIH) 45 0 - 100 mg/dL   Hemoglobin A1c    Specimen: Blood   Result Value Ref Range    Hemoglobin A1C 5.50 4.80 - 5.60 %   Bilirubin, Total & Direct   Result Value Ref Range    Total Bilirubin 2.2 (H) 0.0 - 1.2 mg/dL    Bilirubin, Direct 0.4 (H) 0.0 - 0.3 mg/dL    Bilirubin, Indirect 1.8 mg/dL   Written Authorization   Result Value Ref Range    Written Authorization Comment        Home bp 106/84  110/86  106/83    Weight down 10 lbs  1 hour walk 3-4 times a week    Pulse average is 100  Concern heart rate staying up.   The following portions of the patient's history were reviewed and updated as appropriate: allergies, current medications, past family history, past medical history, past social history, past surgical history and problem list.    Review of Systems   Constitutional: Negative for activity change, appetite change, fatigue and unexpected weight change.   HENT: Negative for nosebleeds and trouble swallowing.    Eyes: Negative for pain and visual disturbance.   Respiratory: Negative for chest tightness, shortness of breath and wheezing.    Cardiovascular: Negative for chest pain and palpitations.   Gastrointestinal: Negative for abdominal pain and blood in stool.   Endocrine: Negative.  Negative for polydipsia, polyphagia and polyuria.   Genitourinary:  Negative for difficulty urinating and hematuria.   Musculoskeletal: Negative for joint swelling.   Skin: Negative for color change, pallor and rash.   Allergic/Immunologic: Negative.    Neurological: Negative for dizziness, tremors, seizures, syncope, speech difficulty, weakness and headaches.   Hematological: Negative for adenopathy.   Psychiatric/Behavioral: Negative for agitation and confusion. The patient is not nervous/anxious.    All other systems reviewed and are negative.      Objective   Physical Exam  Vitals signs and nursing note reviewed.   Constitutional:       General: He is not in acute distress.     Appearance: He is well-developed. He is not diaphoretic.   HENT:      Head: Normocephalic.   Eyes:      Conjunctiva/sclera: Conjunctivae normal.      Pupils: Pupils are equal, round, and reactive to light.   Neck:      Musculoskeletal: Normal range of motion and neck supple.      Vascular: No carotid bruit.   Cardiovascular:      Rate and Rhythm: Regular rhythm. Tachycardia present.      Heart sounds: Normal heart sounds. No murmur.   Pulmonary:      Effort: Pulmonary effort is normal. No respiratory distress.      Breath sounds: Normal breath sounds.   Musculoskeletal: Normal range of motion.   Skin:     General: Skin is warm and dry.      Findings: No rash.   Neurological:      General: No focal deficit present.      Mental Status: He is alert and oriented to person, place, and time.   Psychiatric:         Mood and Affect: Mood normal. Affect is not inappropriate.         Behavior: Behavior normal.         Thought Content: Thought content normal.         Judgment: Judgment normal.         Assessment/Plan   Diagnoses and all orders for this visit:    1. Type 2 diabetes mellitus without complication, without long-term current use of insulin (CMS/[facility]) (Primary)    2. Benign essential HTN    3. Vitamin D deficiency    4. Hyperlipidemia, mixed    5. Tachycardia    Other orders  -     lisinopril  (PRINIVIL,ZESTRIL) 10 MG tablet; Take 1 tablet by mouth Daily. New dose; stop Amlodipine  Dispense: 90 tablet; Refill: 1  -     metoprolol succinate XL (Toprol XL) 50 MG 24 hr tablet; Take 1 tablet by mouth Daily. For heart rate and BP with Lisinopril 10mg daily  Dispense: 30 tablet; Refill: 0    Juan M Quiroz, was seen today.  he was seen for DMII and refilled medications, Hyperlipidemia and will continue current medication and HTN--bp too low; heart rate too high adnd change Rx.  I do have him to see GI--DR Beatty d/t bilirubin so high;  Did liver u/s and neg; making him appt.collaberated with DR Beatty--direct bili was 0.4 and indirect 1.8     Stop Amlodipine  Start Toprol 50mg  Average heart rate in low 100s----symptomatic  D/w Dr Zazueta--lower Lisinopril to 10mg and add Toprol 50mg       Answers for HPI/ROS submitted by the patient on 2/11/2021   Diabetes problem  What is the primary reason for your visit?: Diabetes

## 2021-02-25 ENCOUNTER — OFFICE VISIT (OUTPATIENT)
Dept: FAMILY MEDICINE CLINIC | Facility: CLINIC | Age: 51
End: 2021-02-25

## 2021-02-25 VITALS
RESPIRATION RATE: 16 BRPM | SYSTOLIC BLOOD PRESSURE: 90 MMHG | OXYGEN SATURATION: 95 % | TEMPERATURE: 96.7 F | HEIGHT: 71 IN | WEIGHT: 197 LBS | BODY MASS INDEX: 27.58 KG/M2 | DIASTOLIC BLOOD PRESSURE: 60 MMHG | HEART RATE: 84 BPM

## 2021-02-25 DIAGNOSIS — I10 BENIGN ESSENTIAL HTN: ICD-10-CM

## 2021-02-25 DIAGNOSIS — E11.9 TYPE 2 DIABETES MELLITUS WITHOUT COMPLICATION, WITHOUT LONG-TERM CURRENT USE OF INSULIN (HCC): Primary | ICD-10-CM

## 2021-02-25 PROCEDURE — 99213 OFFICE O/P EST LOW 20 MIN: CPT | Performed by: PHYSICIAN ASSISTANT

## 2021-02-25 RX ORDER — METOPROLOL SUCCINATE 50 MG/1
50 TABLET, EXTENDED RELEASE ORAL DAILY
Qty: 90 TABLET | Refills: 1 | Status: SHIPPED | OUTPATIENT
Start: 2021-02-25 | End: 2021-03-12

## 2021-02-25 RX ORDER — LISINOPRIL 5 MG/1
5 TABLET ORAL DAILY
Qty: 90 TABLET | Refills: 1 | Status: SHIPPED | OUTPATIENT
Start: 2021-02-25 | End: 2022-02-17

## 2021-02-25 RX ORDER — ROSUVASTATIN CALCIUM 10 MG/1
10 TABLET, COATED ORAL DAILY
Qty: 90 TABLET | Refills: 3 | Status: SHIPPED | OUTPATIENT
Start: 2021-02-25 | End: 2022-02-17 | Stop reason: SDUPTHER

## 2021-02-25 NOTE — PROGRESS NOTES
"Subjective   Juan M Collins is a 51 y.o. male.     History of Present Illness    Since the last visit, he has overall felt fairly well.  He has DMII--now controlled;  HTN and just saw him and d/t heart rate staying >100 and d/w DR Zazueta, did not Norvasc and changed to Toprol XL 50mg and kept Lisinopril same.  Here for bp and pulse check.  Pulse under 130 now with exercise and was 160s.  he has been compliant with current medications have reviewed them.  The patient denies medication side effects.  Will refill medications. BP 90/60   Pulse 84   Temp 96.7 °F (35.9 °C)   Resp 16   Ht 180.3 cm (70.98\")   Wt 89.4 kg (197 lb)   SpO2 95%   BMI 27.49 kg/m²     Results for orders placed or performed in visit on 12/18/20   Comprehensive Metabolic Panel    Specimen: Blood   Result Value Ref Range    Glucose 125 (H) 65 - 99 mg/dL    BUN 9 6 - 20 mg/dL    Creatinine 0.75 (L) 0.76 - 1.27 mg/dL    eGFR Non African Am 111 >60 mL/min/1.73    eGFR African Am 135 >60 mL/min/1.73    BUN/Creatinine Ratio 12.0 7.0 - 25.0    Sodium 140 136 - 145 mmol/L    Potassium 4.1 3.5 - 5.2 mmol/L    Chloride 104 98 - 107 mmol/L    Total CO2 26.7 22.0 - 29.0 mmol/L    Calcium 9.6 8.6 - 10.5 mg/dL    Total Protein 7.8 6.0 - 8.5 g/dL    Albumin 4.80 3.50 - 5.20 g/dL    Globulin 3.0 gm/dL    A/G Ratio 1.6 g/dL    Total Bilirubin 2.3 (H) 0.0 - 1.2 mg/dL    Alkaline Phosphatase 70 39 - 117 U/L    AST (SGOT) 19 1 - 40 U/L    ALT (SGPT) 24 1 - 41 U/L   Vitamin D 25 Hydroxy    Specimen: Blood   Result Value Ref Range    25 Hydroxy, Vitamin D 27.0 (L) 30.0 - 100.0 ng/ml   Comprehensive metabolic panel    Specimen: Blood   Result Value Ref Range    Glucose 113 (H) 65 - 99 mg/dL    BUN 8 6 - 20 mg/dL    Creatinine 0.90 0.76 - 1.27 mg/dL    eGFR Non African Am 90 >60 mL/min/1.73    eGFR African Am 109 >60 mL/min/1.73    BUN/Creatinine Ratio 8.9 7.0 - 25.0    Sodium 141 136 - 145 mmol/L    Potassium 4.2 3.5 - 5.2 mmol/L    Chloride 101 98 - 107 mmol/L "    Total CO2 27.5 22.0 - 29.0 mmol/L    Calcium 9.7 8.6 - 10.5 mg/dL    Total Protein 7.2 6.0 - 8.5 g/dL    Albumin 5.00 3.50 - 5.20 g/dL    Globulin 2.2 gm/dL    A/G Ratio 2.3 g/dL    Total Bilirubin 2.3 (H) 0.0 - 1.2 mg/dL    Alkaline Phosphatase 62 39 - 117 U/L    AST (SGOT) 19 1 - 40 U/L    ALT (SGPT) 15 1 - 41 U/L   Lipid panel    Specimen: Blood   Result Value Ref Range    Total Cholesterol 113 0 - 200 mg/dL    Triglycerides 101 0 - 150 mg/dL    HDL Cholesterol 49 40 - 60 mg/dL    VLDL Cholesterol Delbert 19 5 - 40 mg/dL    LDL Chol Calc (NIH) 45 0 - 100 mg/dL   Hemoglobin A1c    Specimen: Blood   Result Value Ref Range    Hemoglobin A1C 5.50 4.80 - 5.60 %   Bilirubin, Total & Direct   Result Value Ref Range    Total Bilirubin 2.2 (H) 0.0 - 1.2 mg/dL    Bilirubin, Direct 0.4 (H) 0.0 - 0.3 mg/dL    Bilirubin, Indirect 1.8 mg/dL   Written Authorization   Result Value Ref Range    Written Authorization Comment        102/80 P 84  120/97  99/81 P 89  99/82   107/85  110/88    The following portions of the patient's history were reviewed and updated as appropriate: allergies, current medications, past family history, past medical history, past social history, past surgical history and problem list.    Review of Systems   Constitutional: Negative for activity change, appetite change and unexpected weight change.   HENT: Negative for nosebleeds and trouble swallowing.    Eyes: Negative for pain and visual disturbance.   Respiratory: Negative for chest tightness, shortness of breath and wheezing.    Cardiovascular: Negative for chest pain and palpitations.   Gastrointestinal: Negative for abdominal pain and blood in stool.   Endocrine: Negative.    Genitourinary: Negative for difficulty urinating and hematuria.   Musculoskeletal: Negative for joint swelling and neck pain.   Skin: Negative for color change and rash.   Allergic/Immunologic: Negative.    Neurological: Negative for syncope, speech difficulty and headaches.    Hematological: Negative for adenopathy.   Psychiatric/Behavioral: Negative for agitation and confusion.   All other systems reviewed and are negative.      Objective   Physical Exam  Vitals signs and nursing note reviewed.   Constitutional:       General: He is not in acute distress.     Appearance: Normal appearance. He is well-developed. He is not diaphoretic.   HENT:      Head: Normocephalic.   Eyes:      General: No scleral icterus.     Conjunctiva/sclera: Conjunctivae normal.      Pupils: Pupils are equal, round, and reactive to light.   Neck:      Musculoskeletal: Normal range of motion and neck supple.   Cardiovascular:      Rate and Rhythm: Normal rate and regular rhythm.      Pulses: Normal pulses.      Heart sounds: Normal heart sounds. No murmur.   Pulmonary:      Effort: Pulmonary effort is normal.   Musculoskeletal: Normal range of motion.   Skin:     General: Skin is warm and dry.      Findings: No rash.   Neurological:      Mental Status: He is alert and oriented to person, place, and time.   Psychiatric:         Mood and Affect: Mood normal. Affect is not inappropriate.         Behavior: Behavior normal.         Thought Content: Thought content normal.         Judgment: Judgment normal.         Assessment/Plan   Diagnoses and all orders for this visit:    1. Type 2 diabetes mellitus without complication, without long-term current use of insulin (CMS/[facility]) (Primary)    2. Benign essential HTN    Other orders  -     lisinopril (PRINIVIL,ZESTRIL) 5 MG tablet; Take 1 tablet by mouth Daily. New dose for BP  Dispense: 90 tablet; Refill: 1  -     metoprolol succinate XL (Toprol XL) 50 MG 24 hr tablet; Take 1 tablet by mouth Daily. For heart rate and BP with Lisinopril 5 mg daily  Dispense: 90 tablet; Refill: 1  -     rosuvastatin (Crestor) 10 MG tablet; Take 1 tablet by mouth Daily. For cholesterol with Co Q 10  Dispense: 90 tablet; Refill: 3      Here today since changed Norvasc to Toprol XL 50mg for  sinus tachycardia and kept ACE---working well.  Pulse in 80s  Has f/u labs ordered to follow DMII  Cut dose ACE again; bp 90/60; email me readings  Plan f/u 5-6 mos; watching bp       Answers for HPI/ROS submitted by the patient on 2/19/2021   Hypertension  What is the primary reason for your visit?: High Blood Pressure

## 2021-03-12 RX ORDER — METOPROLOL SUCCINATE 50 MG/1
TABLET, EXTENDED RELEASE ORAL
Qty: 90 TABLET | Refills: 1 | Status: SHIPPED | OUTPATIENT
Start: 2021-03-12 | End: 2022-02-17 | Stop reason: SDUPTHER

## 2021-04-01 ENCOUNTER — OFFICE VISIT (OUTPATIENT)
Dept: GASTROENTEROLOGY | Facility: CLINIC | Age: 51
End: 2021-04-01

## 2021-04-01 VITALS
HEIGHT: 71 IN | WEIGHT: 194 LBS | SYSTOLIC BLOOD PRESSURE: 110 MMHG | DIASTOLIC BLOOD PRESSURE: 65 MMHG | BODY MASS INDEX: 27.16 KG/M2

## 2021-04-01 DIAGNOSIS — I10 ESSENTIAL HYPERTENSION: Chronic | ICD-10-CM

## 2021-04-01 DIAGNOSIS — R17 TOTAL BILIRUBIN, ELEVATED: Primary | Chronic | ICD-10-CM

## 2021-04-01 DIAGNOSIS — E11.9 CONTROLLED TYPE 2 DIABETES MELLITUS WITHOUT COMPLICATION, WITHOUT LONG-TERM CURRENT USE OF INSULIN (HCC): Chronic | ICD-10-CM

## 2021-04-01 LAB
BILIRUB DIRECT SERPL-MCNC: 0.3 MG/DL (ref 0–0.3)
BILIRUB INDIRECT SERPL-MCNC: 0.8 MG/DL
BILIRUB SERPL-MCNC: 1.1 MG/DL (ref 0–1.2)
GGT SERPL-CCNC: 32 U/L (ref 8–61)

## 2021-04-01 PROCEDURE — 99204 OFFICE O/P NEW MOD 45 MIN: CPT | Performed by: INTERNAL MEDICINE

## 2021-04-01 NOTE — PROGRESS NOTES
Chief Complaint   Patient presents with   • Elevated Hepatic Enzymes       Subjective     HPI    Juan M Collins is a 51 y.o. male with a past medical history noted below who presents for elevated bilirubin.  This has been present on labs with his pcp going back to November 30, 2020.  Bilirubin at that time was 1.5.  This was just after he had Covid.  He was also incidentally diagnosed with diabetes and hypertension at that time.  He was subsequently treated.  Subsequent lab checks showed increases in his total bilirubin, up to a peak of 2.3.  This was predominantly indirect though his direct bilirubin was elevated at 0.4 as well.  He denies any right upper quadrant pain, no jaundice, no icterus.  He does say that in the 90s, he was regularly donating blood at the [facility] and at one point was told that he had to stop due to findings consistent with nonalcoholic fatty liver disease.    He used to weigh 230 in the fall of last year.  He is made significant lifestyle changes and is now under 200 pounds.    Possibility of a GI malignancy in a grandmother but he is not certain.      No ETOH.  No smoking.      He is a diabetic, on metformin and semaglutide.      No abdominal surgeries    He works in [employer] within the past year.    Today's visit was in the office.  Both the patient and I were wearing face masks and proper hand hygiene was performed before and after the physical exam.           Current Outpatient Medications:   •  Blood Glucose Monitoring Suppl (Accu-Chek Guide) w/Device kit, USE ONCE DAILY AND AS NEEDED, Disp: , Rfl:   •  glucose blood test strip, Check glucose daily and PRN  for E11.65, Disp: 90 each, Rfl: 12  •  glucose monitor monitoring kit, 1 each Daily. Test glucose daily and PRN for DMII E11.65, Disp: 1 each, Rfl: 0  •  lisinopril (PRINIVIL,ZESTRIL) 5 MG tablet, Take 1 tablet by mouth Daily. New dose for BP, Disp: 90 tablet, Rfl: 1  •  metFORMIN ER (GLUCOPHAGE-XR) 500 MG 24 hr  tablet, Start with 1 PO daily with food for DMII and titrate up to 4 PO daily, Disp: 120 tablet, Rfl: 5  •  metoprolol succinate XL (TOPROL-XL) 50 MG 24 hr tablet, TAKE 1 TABLET BY MOUTH DAILY FOR HEART RATE AND BLOOD PRESSURE WITH LISINOPRIL 10MG DAILY, Disp: 90 tablet, Rfl: 1  •  rosuvastatin (Crestor) 10 MG tablet, Take 1 tablet by mouth Daily. For cholesterol with Co Q 10, Disp: 90 tablet, Rfl: 3  •  Semaglutide,0.25 or 0.5MG/DOS, (Ozempic, 0.25 or 0.5 MG/DOSE,) 2 MG/1.5ML solution pen-injector, Inject 0.5mg subcutaneously once weekly for DMII, Disp: 4 pen, Rfl: 5      Objective     Vitals:    04/01/21 1007   BP: 110/65         04/01/21  1007   Weight: 88 kg (194 lb)     Body mass index is 27.07 kg/m².    Physical Exam  Vitals reviewed.   Constitutional:       General: He is not in acute distress.     Appearance: Normal appearance. He is well-developed. He is not diaphoretic.   HENT:      Head: Normocephalic.   Neck:      Thyroid: No thyromegaly.   Cardiovascular:      Rate and Rhythm: Normal rate and regular rhythm.   Pulmonary:      Effort: Pulmonary effort is normal. No respiratory distress.      Breath sounds: Normal breath sounds. No wheezing.   Abdominal:      General: Bowel sounds are normal. There is no distension.      Palpations: Abdomen is soft. Abdomen is not rigid. There is no mass.      Tenderness: There is no abdominal tenderness. There is no guarding or rebound.      Hernia: No hernia is present.   Genitourinary:     Comments: Rectal exam deferred  Musculoskeletal:         General: No tenderness.   Neurological:      Mental Status: He is alert and oriented to person, place, and time.      Motor: No atrophy.      Coordination: Coordination normal.   Psychiatric:         Behavior: Behavior normal.         Thought Content: Thought content normal.         Judgment: Judgment normal.             WBC   Date Value Ref Range Status   11/30/2020 5.55 3.40 - 10.80 10*3/mm3 Final     RBC   Date Value Ref  Range Status   11/30/2020 5.18 4.14 - 5.80 10*6/mm3 Final     Hemoglobin   Date Value Ref Range Status   11/30/2020 16.7 13.0 - 17.7 g/dL Final     Hematocrit   Date Value Ref Range Status   11/30/2020 47.5 37.5 - 51.0 % Final     MCV   Date Value Ref Range Status   11/30/2020 91.7 79.0 - 97.0 fL Final     MCH   Date Value Ref Range Status   11/30/2020 32.2 26.6 - 33.0 pg Final     MCHC   Date Value Ref Range Status   11/30/2020 35.2 31.5 - 35.7 g/dL Final     RDW   Date Value Ref Range Status   11/30/2020 12.5 12.3 - 15.4 % Final     Platelets   Date Value Ref Range Status   11/30/2020 251 140 - 450 10*3/mm3 Final     Neutrophil Rel %   Date Value Ref Range Status   11/30/2020 60.2 42.7 - 76.0 % Final     Lymphocyte Rel %   Date Value Ref Range Status   11/30/2020 30.1 19.6 - 45.3 % Final     Monocyte Rel %   Date Value Ref Range Status   11/30/2020 6.5 5.0 - 12.0 % Final     Eosinophil Rel %   Date Value Ref Range Status   11/30/2020 1.8 0.3 - 6.2 % Final     Basophil Rel %   Date Value Ref Range Status   11/30/2020 0.9 0.0 - 1.5 % Final     Neutrophils Absolute   Date Value Ref Range Status   11/30/2020 3.34 1.70 - 7.00 10*3/mm3 Final     Lymphocytes Absolute   Date Value Ref Range Status   11/30/2020 1.67 0.70 - 3.10 10*3/mm3 Final     Monocytes Absolute   Date Value Ref Range Status   11/30/2020 0.36 0.10 - 0.90 10*3/mm3 Final     Eosinophils Absolute   Date Value Ref Range Status   11/30/2020 0.10 0.00 - 0.40 10*3/mm3 Final     Basophils Absolute   Date Value Ref Range Status   11/30/2020 0.05 0.00 - 0.20 10*3/mm3 Final     nRBC   Date Value Ref Range Status   11/30/2020 0.0 0.0 - 0.2 /100 WBC Final       Lab Results   Component Value Date    BUN 8 02/12/2021    CREATININE 0.90 02/12/2021    EGFRIFNONA 90 02/12/2021    EGFRIFAFRI 109 02/12/2021    BCR 8.9 02/12/2021    CO2 27.5 02/12/2021    CALCIUM 9.7 02/12/2021    PROTENTOTREF 7.2 02/12/2021    ALBUMIN 5.00 02/12/2021    LABIL2 2.3 02/12/2021    AST 19  02/12/2021    ALT 15 02/12/2021         US LIVER-  01/17/2021     HISTORY: Elevated liver enzymes.     The gallbladder is well-distended with no gallstones wall thickening or  pericholecystic fluid.     Common bile duct is not dilated measuring 1.8 mm.     The liver appears within normal limits. Pancreas appears grossly normal.     Right kidney measures 12.4 cm in length and appears within normal  limits.     IMPRESSION:  No abnormalities identified on this liver ultrasound.     This report was finalized on 1/18/2021 3:16 PM by Dr. Jos Bob M.D.    I personally reviewed data as detailed below:     The labs listed above.    The radiology studies listed above.    Office notes from: KAT Caballero pcp 2/18/21        No notes on file    Assessment/Plan    1.  Elevated total bilirubin: Suspect this is most likely Gilbert's though the slightly elevated direct bilirubin is a little unusual.  Also does have risk factors with his prior obesity (now overweight with weight loss), type 2 diabetes, and hypertension for fatty liver disease, he also reports a history of this though recent imaging is reassuring    2.  Type 2 diabetes, hypertension    Plan  We will repeat labs again today with total and direct bilirubin, GGT level  If this is normal, no further work-up is needed, this would confirm Gilbert's, a benign condition  Continued healthy lifestyle changes--maintaining weight less than 200 pounds, controlling diabetes and blood pressure  Other recommendations after labs    Diagnoses and all orders for this visit:    1. Total bilirubin, elevated (Primary)  -     Bilirubin, Total & Direct  -     Gamma GT    2. Essential hypertension    3. Controlled type 2 diabetes mellitus without complication, without long-term current use of insulin (CMS/[facility])        I have discussed the above plan with the patient.  They verbalize understanding and are in agreement with the plan.  They have been advised to contact the office for any  questions, concerns, or changes related to their health.    Dictated utilizing Dragon dictation

## 2021-04-02 ENCOUNTER — BULK ORDERING (OUTPATIENT)
Dept: CASE MANAGEMENT | Facility: OTHER | Age: 51
End: 2021-04-02

## 2021-04-02 DIAGNOSIS — Z23 IMMUNIZATION DUE: ICD-10-CM

## 2021-04-05 NOTE — PROGRESS NOTES
His total bilirubin is back down to normal with normal ratios of direct and indirect levels.  This is consistent with Gilbert's syndrome as discussed.

## 2021-04-07 ENCOUNTER — TELEPHONE (OUTPATIENT)
Dept: GASTROENTEROLOGY | Facility: CLINIC | Age: 51
End: 2021-04-07

## 2021-04-07 NOTE — TELEPHONE ENCOUNTER
----- Message from Rosana Beatty MD sent at 4/5/2021 10:44 AM EDT -----  His total bilirubin is back down to normal with normal ratios of direct and indirect levels.  This is consistent with Gilbert's syndrome as discussed.

## 2021-10-21 ENCOUNTER — TELEPHONE (OUTPATIENT)
Dept: FAMILY MEDICINE CLINIC | Facility: CLINIC | Age: 51
End: 2021-10-21

## 2021-10-21 RX ORDER — SEMAGLUTIDE 1.34 MG/ML
INJECTION, SOLUTION SUBCUTANEOUS
Qty: 4 PEN | Refills: 0 | Status: SHIPPED | OUTPATIENT
Start: 2021-10-21 | End: 2022-02-17 | Stop reason: SDUPTHER

## 2022-02-17 ENCOUNTER — OFFICE VISIT (OUTPATIENT)
Dept: FAMILY MEDICINE CLINIC | Facility: CLINIC | Age: 52
End: 2022-02-17

## 2022-02-17 VITALS
OXYGEN SATURATION: 97 % | HEIGHT: 71 IN | WEIGHT: 225 LBS | RESPIRATION RATE: 16 BRPM | DIASTOLIC BLOOD PRESSURE: 80 MMHG | SYSTOLIC BLOOD PRESSURE: 125 MMHG | HEART RATE: 110 BPM | BODY MASS INDEX: 31.5 KG/M2 | TEMPERATURE: 97.5 F

## 2022-02-17 DIAGNOSIS — I10 ESSENTIAL HYPERTENSION: ICD-10-CM

## 2022-02-17 DIAGNOSIS — Z12.5 SCREENING PSA (PROSTATE SPECIFIC ANTIGEN): ICD-10-CM

## 2022-02-17 DIAGNOSIS — R94.31 ABNORMAL EKG: ICD-10-CM

## 2022-02-17 DIAGNOSIS — E78.2 HYPERLIPIDEMIA, MIXED: ICD-10-CM

## 2022-02-17 DIAGNOSIS — R00.0 TACHYCARDIA: ICD-10-CM

## 2022-02-17 DIAGNOSIS — Z09 HOSPITAL DISCHARGE FOLLOW-UP: Primary | ICD-10-CM

## 2022-02-17 DIAGNOSIS — E11.9 TYPE 2 DIABETES MELLITUS WITHOUT COMPLICATION, WITHOUT LONG-TERM CURRENT USE OF INSULIN: ICD-10-CM

## 2022-02-17 DIAGNOSIS — E55.9 VITAMIN D DEFICIENCY: ICD-10-CM

## 2022-02-17 PROBLEM — A41.9 SEPSIS: Status: ACTIVE | Noted: 2022-01-23

## 2022-02-17 PROBLEM — K81.0 ACUTE CHOLECYSTITIS: Status: ACTIVE | Noted: 2022-01-23

## 2022-02-17 PROCEDURE — 99214 OFFICE O/P EST MOD 30 MIN: CPT | Performed by: PHYSICIAN ASSISTANT

## 2022-02-17 RX ORDER — SEMAGLUTIDE 1.34 MG/ML
INJECTION, SOLUTION SUBCUTANEOUS
Qty: 4 PEN | Refills: 3 | Status: SHIPPED | OUTPATIENT
Start: 2022-02-17

## 2022-02-17 RX ORDER — METOPROLOL SUCCINATE 50 MG/1
50 TABLET, EXTENDED RELEASE ORAL DAILY
Qty: 90 TABLET | Refills: 1 | Status: SHIPPED | OUTPATIENT
Start: 2022-02-17

## 2022-02-17 RX ORDER — LISINOPRIL 20 MG/1
20 TABLET ORAL DAILY
COMMUNITY
End: 2022-02-17 | Stop reason: SDUPTHER

## 2022-02-17 RX ORDER — ROSUVASTATIN CALCIUM 10 MG/1
10 TABLET, COATED ORAL DAILY
Qty: 90 TABLET | Refills: 3 | Status: SHIPPED | OUTPATIENT
Start: 2022-02-17

## 2022-02-17 RX ORDER — METFORMIN HYDROCHLORIDE 500 MG/1
TABLET, EXTENDED RELEASE ORAL
Qty: 180 TABLET | Refills: 3 | Status: SHIPPED | OUTPATIENT
Start: 2022-02-17

## 2022-02-17 RX ORDER — LISINOPRIL 20 MG/1
20 TABLET ORAL DAILY
Qty: 90 TABLET | Refills: 1 | Status: SHIPPED | OUTPATIENT
Start: 2022-02-17 | End: 2022-02-21

## 2022-02-17 NOTE — PATIENT INSTRUCTIONS
Diabetes Mellitus Basics    Diabetes mellitus, or diabetes, is a long-term (chronic) disease. It occurs when the body does not properly use sugar (glucose) that is released from food after you eat.  Diabetes mellitus may be caused by one or both of these problems:  · Your pancreas does not make enough of a hormone called insulin.  · Your body does not react in a normal way to the insulin that it makes.  Insulin lets glucose enter cells in your body. This gives you energy. If you have diabetes, glucose cannot get into cells. This causes high blood glucose (hyperglycemia).  How to treat and manage diabetes  You may need to take insulin or other diabetes medicines daily to keep your glucose in balance. If you are prescribed insulin, you will learn how to give yourself insulin by injection. You may need to adjust the amount of insulin you take based on the foods that you eat.  You will need to check your blood glucose levels using a glucose monitor as told by your health care provider. The readings can help determine if you have low or high blood glucose.  Generally, you should have these blood glucose levels:  · Before meals (preprandial):  mg/dL (4.4-7.2 mmol/L).  · After meals (postprandial): below 180 mg/dL (10 mmol/L).  · Hemoglobin A1c (HbA1c) level: less than 7%.  Your health care provider will set treatment goals for you.  Keep all follow-up visits. This is important.  Follow these instructions at home:  Diabetes medicines  Take your diabetes medicines every day as told by your health care provider. List your diabetes medicines here:  · Name of medicine: ______________________________  ? Amount (dose): _______________ Time (a.m./p.m.): _______________ Notes: ___________________________________  · Name of medicine: ______________________________  ? Amount (dose): _______________ Time (a.m./p.m.): _______________ Notes: ___________________________________  · Name of medicine:  ______________________________  ? Amount (dose): _______________ Time (a.m./p.m.): _______________ Notes: ___________________________________  Insulin  If you use insulin, list the types of insulin you use here:  · Insulin type: ______________________________  ? Amount (dose): _______________ Time (a.m./p.m.): _______________Notes: ___________________________________  · Insulin type: ______________________________  ? Amount (dose): _______________ Time (a.m./p.m.): _______________ Notes: ___________________________________  · Insulin type: ______________________________  ? Amount (dose): _______________ Time (a.m./p.m.): _______________ Notes: ___________________________________  · Insulin type: ______________________________  ? Amount (dose): _______________ Time (a.m./p.m.): _______________ Notes: ___________________________________  · Insulin type: ______________________________  ? Amount (dose): _______________ Time (a.m./p.m.): _______________ Notes: ___________________________________  Managing blood glucose    Check your blood glucose levels using a glucose monitor as told by your health care provider.  Write down the times that you check your glucose levels here:  · Time: _______________ Notes: ___________________________________  · Time: _______________ Notes: ___________________________________  · Time: _______________ Notes: ___________________________________  · Time: _______________ Notes: ___________________________________  · Time: _______________ Notes: ___________________________________  · Time: _______________ Notes: ___________________________________    Low blood glucose  Low blood glucose (hypoglycemia) is when glucose is at or below 70 mg/dL (3.9 mmol/L). Symptoms may include:  · Feeling:  ? Hungry.  ? Sweaty and clammy.  ? Irritable or easily upset.  ? Dizzy.  ? Sleepy.  · Having:  ? A fast heartbeat.  ? A headache.  ? A change in your vision.  ? Numbness around the mouth, lips, or  tongue.  · Having trouble with:  ? Moving (coordination).  ? Sleeping.  Treating low blood glucose  To treat low blood glucose, eat or drink something containing sugar right away. If you can think clearly and swallow safely, follow the 15:15 rule:  · Take 15 grams of a fast-acting carb (carbohydrate), as told by your health care provider.  · Some fast-acting carbs are:  ? Glucose tablets: take 3-4 tablets.  ? Hard candy: eat 3-5 pieces.  ? Fruit juice: drink 4 oz (120 mL).  ? Regular (not diet) soda: drink 4-6 oz (120-180 mL).  ? Honey or sugar: eat 1 Tbsp (15 mL).  · Check your blood glucose levels 15 minutes after you take the carb.  · If your glucose is still at or below 70 mg/dL (3.9 mmol/L), take 15 grams of a carb again.  · If your glucose does not go above 70 mg/dL (3.9 mmol/L) after 3 tries, get help right away.  · After your glucose goes back to normal, eat a meal or a snack within 1 hour.  Treating very low blood glucose  If your glucose is at or below 54 mg/dL (3 mmol/L), you have very low blood glucose (severe hypoglycemia).  This is an emergency. Do not wait to see if the symptoms will go away. Get medical help right away. Call your local emergency services (911 in the [place] Do not drive yourself to the hospital.  Questions to ask your health care provider  · Should I talk with a diabetes educator?  · What equipment will I need to care for myself at home?  · What diabetes medicines do I need? When should I take them?  · How often do I need to check my blood glucose levels?  · What number can I call if I have questions?  · When is my follow-up visit?  · Where can I find a support group for people with diabetes?  Where to find more information  · American Diabetes Association: www.diabetes.org  · Association of Diabetes Care and Education Specialists: www.diabeteseducator.org  Contact a health care provider if:  · Your blood glucose is at or above 240 mg/dL (13.3 mmol/L) for 2 days in a row.  · You have  been sick or have had a fever for 2 days or more, and you are not getting better.  · You have any of these problems for more than 6 hours:  ? You cannot eat or drink.  ? You feel nauseous.  ? You vomit.  ? You have diarrhea.  Get help right away if:  · Your blood glucose is lower than 54 mg/dL (3 mmol/L).  · You get confused.  · You have trouble thinking clearly.  · You have trouble breathing.  These symptoms may represent a serious problem that is an emergency. Do not wait to see if the symptoms will go away. Get medical help right away. Call your local emergency services (911 in the [place] Do not drive yourself to the hospital.  Summary  · Diabetes mellitus is a chronic disease that occurs when the body does not properly use sugar (glucose) that is released from food after you eat.  · Take insulin and diabetes medicines as told.  · Check your blood glucose every day, as often as told.  · Keep all follow-up visits. This is important.  This information is not intended to replace advice given to you by your health care provider. Make sure you discuss any questions you have with your health care provider.  Document Revised: 04/20/2021 Document Reviewed: 04/20/2021  [employer] Patient Education © 2021 [employer]

## 2022-02-17 NOTE — PROGRESS NOTES
Subjective   Juan M Collins is a 52 y.o. male.     History of Present Illness   Juan M Collins 52 y.o. male presents today for hosptial follow up.  he was treated 1-23-22 to  2-2-22 for cholecystitis.  I reviewed all of the labs and diagnostic testing and noted:  CTA--labs  The patient's medications were changed:---??some how not taking Toprol?  Current outpatient and discharge medications have been reconciled for the patient.  Reviewed by: Carisa Caballero PA-C    he does have a follow up appointment with a specialist:  Cholecystostomy drain was placed inpatient on 1/27/2022  Also noting blood pressure was elevated on his follow-up with general surgeon  146/100 on 2/9/2022.  Jerod Tobin MD - 02/09/2022 12:50 PM EST  Formatting of this note might be different from the original.  Follow-up visit after recent hospitalization for severe acute cholecystitis. He underwent IR guided cholecystostomy drainage at that time and his hospitalization was somewhat prolonged with ileus. Since discharge home, he has continued to slowly and steadily improved. His abdomen is softer and less distended in the office today than it was in the hospital recently. He looks better and feels better as well. Cholecystostomy tube drainage remains stable and appears as expected. No fever reported. Appetite improving. I think the tube should remain in place until the 21st of this month and I will plan to remove it in my clinic at that time. Future treatment plans after that point will be based on his clinical findings going forward. Total time spent on this patient encounter today was twenty minutes (including review of medical chart, imaging evaluation, patient exam, counseling, and preparation of medical records).    Also noting patient had EKG x2 while inpatient and 1/23/2022 EKG read by Dr. Stringer with diagnosis sinus tachycardia, probable left atrial abnormality, poor R wave progression, left ventricular hypertrophy, nonspecific  T wave flattening.  EKG performed again on 1/26/2022 and read by Dr. Chiu and diagnosed sinus tachycardia, left ventricular hypertrophy, lateral infarct, age indeterminate    CTA chest and CT abd/pelvis 1-23-22  Chest showed no mediastinal lymphadenopathy or PE.  No significant atherosclerotic disease.  Lung windows without pneumothorax did note perihilar and basilar atelectasis bilateral.  No consolidation.  Abdomen with distention of the gallbladder with significant gallbladder wall thickening and pericholecystic inflammatory stranding, spleen and adrenal pancreas normal.  Normal kidneys stomach and duodenum normal, mild gaseous distention of the proximal small bowel loops.  Small diverticuli in the sigmoid colon  Pelvis showed normal urinary bladder and normal prostate and no reactive lymph nodes in retroperitoneum  I see he had repeat chest x-rays in noted the atelectasis was much improved in basilar area bilateral and note chest x-ray 1/31/2022 showed no pleural fluid  CT of the abdomen pelvis with IV contrast was repeated on 1/26/2022  The CT was noted for multiple mildly distended and gas-filled loops of small bowel and colon with air-filled level favoring adynamic ileus.  There is no focal transition to point to suggest bowel obstruction.  Placement of right upper quadrant approach percutaneous cholecystomy tube.  March pericholecystic inflammation and stranding and trace right upper quadrant ascites extending to the pericolic gutter into the pelvis.  Also noted small fat-containing periumbilical hernia, trace pleural effusion right      I want to note on 2-18-21 stopped amlodipine and changed to Toprol XL 50mg    Has seen Dr Beatty GI 4-1-21 for indirect bilirubin;  Gilbert's  Pulse rate high and not taking Toprol    Home readings 138/99, 150/104, 136.98  Home glucose <122  Lab Results   Component Value Date    HGBA1C 6.3 (H) 01/24/2022      Since the last visit, he has overall felt tired.  He has Primary  "Hypertension not at goal, plan to add/adjust medication, DMII well controlled on medication and will continue regimen, Hyperlipidemia with goals met with current Rx and Vitamin D deficiency and will update labs for continued management.  he has been compliant with current medications have reviewed them.  The patient denies medication side effects. Tolerating Ozempic----on ACE also. Update labs.   Will refill medications. /80 (BP Location: Right arm, Patient Position: Sitting, Cuff Size: Adult)   Pulse 110   Temp 97.5 °F (36.4 °C)   Resp 16   Ht 180.3 cm (70.98\")   Wt 102 kg (225 lb)   SpO2 97%   BMI 31.40 kg/m²     Results for orders placed or performed in visit on 04/01/21   Bilirubin, Total & Direct    Specimen: Blood   Result Value Ref Range    Total Bilirubin 1.1 0.0 - 1.2 mg/dL    Bilirubin, Direct 0.3 0.0 - 0.3 mg/dL    Bilirubin, Indirect 0.8 mg/dL   Gamma GT    Specimen: Blood   Result Value Ref Range    GGT 32 8 - 61 U/L         The following portions of the patient's history were reviewed and updated as appropriate: allergies, current medications, past family history, past medical history, past social history, past surgical history and problem list.    Review of Systems   Constitutional: Negative for activity change, appetite change and unexpected weight change.   HENT: Negative for nosebleeds and trouble swallowing.    Eyes: Negative for pain and visual disturbance.   Respiratory: Negative for chest tightness, shortness of breath and wheezing.    Cardiovascular: Negative for chest pain and palpitations.   Gastrointestinal: Negative for abdominal pain and blood in stool.   Endocrine: Negative.    Genitourinary: Negative for difficulty urinating and hematuria.   Musculoskeletal: Negative for joint swelling.   Skin: Negative for color change and rash.   Allergic/Immunologic: Negative.    Neurological: Negative for syncope and speech difficulty.   Hematological: Negative for adenopathy. "   Psychiatric/Behavioral: Negative for agitation and confusion.   All other systems reviewed and are negative.      Objective   Physical Exam  Vitals and nursing note reviewed.   Constitutional:       General: He is not in acute distress.     Appearance: Normal appearance. He is well-developed. He is not diaphoretic.   HENT:      Head: Normocephalic.   Eyes:      Conjunctiva/sclera: Conjunctivae normal.      Pupils: Pupils are equal, round, and reactive to light.   Neck:      Vascular: No carotid bruit.   Cardiovascular:      Rate and Rhythm: Regular rhythm. Tachycardia present.      Pulses: Normal pulses.      Heart sounds: Normal heart sounds. No murmur heard.      Pulmonary:      Effort: Pulmonary effort is normal.      Breath sounds: Normal breath sounds.   Musculoskeletal:         General: Normal range of motion.      Cervical back: Normal range of motion and neck supple.      Right lower leg: No edema.      Left lower leg: No edema.   Skin:     General: Skin is warm and dry.      Findings: No rash.   Neurological:      General: No focal deficit present.      Mental Status: He is alert and oriented to person, place, and time.   Psychiatric:         Mood and Affect: Mood normal. Affect is not inappropriate.         Behavior: Behavior normal.         Thought Content: Thought content normal.         Judgment: Judgment normal.         Assessment/Plan   Diagnoses and all orders for this visit:    1. Hospital discharge follow-up (Primary)    2. Essential hypertension    3. Type 2 diabetes mellitus without complication, without long-term current use of insulin (HCC)    4. Hyperlipidemia, mixed    5. Vitamin D deficiency    6. Abnormal EKG        Concern with inpatient EKG abn---see cardio  Restart Toprol XL 50mg daily and keep Lisinopril 20mg; may need to raise dose.  Start back Toprol first---update me Monday.  Need better control of BP and heart rate  Plan, Juan M Collins, was seen today.  he was seen for HTN and  add/adjust medication, Hyperlipidemia and will continue current medication and Vitamin D deficiency and will update labs .

## 2022-02-18 LAB
25(OH)D3+25(OH)D2 SERPL-MCNC: 32.2 NG/ML (ref 30–100)
ALBUMIN SERPL-MCNC: 5 G/DL (ref 4–5)
ALBUMIN/CREAT UR: 11 MG/G CREAT (ref 0–29)
ALBUMIN/GLOB SERPL: 1.4 {RATIO} (ref 1.2–2.2)
ALP SERPL-CCNC: 77 IU/L (ref 44–121)
ALT SERPL-CCNC: 32 IU/L (ref 0–44)
AST SERPL-CCNC: 31 IU/L (ref 0–40)
BASOPHILS # BLD AUTO: 0.1 X10E3/UL (ref 0–0.2)
BASOPHILS NFR BLD AUTO: 1 %
BILIRUB SERPL-MCNC: 2.1 MG/DL (ref 0–1.2)
BUN SERPL-MCNC: 9 MG/DL (ref 6–24)
BUN/CREAT SERPL: 10 (ref 9–20)
CALCIUM SERPL-MCNC: 10 MG/DL (ref 8.7–10.2)
CHLORIDE SERPL-SCNC: 99 MMOL/L (ref 96–106)
CHOLEST SERPL-MCNC: 110 MG/DL (ref 100–199)
CO2 SERPL-SCNC: 23 MMOL/L (ref 20–29)
CREAT SERPL-MCNC: 0.86 MG/DL (ref 0.76–1.27)
CREAT UR-MCNC: 230.1 MG/DL
EOSINOPHIL # BLD AUTO: 0.2 X10E3/UL (ref 0–0.4)
EOSINOPHIL NFR BLD AUTO: 2 %
ERYTHROCYTE [DISTWIDTH] IN BLOOD BY AUTOMATED COUNT: 12.4 % (ref 11.6–15.4)
FOLATE SERPL-MCNC: 10 NG/ML
GLOBULIN SER CALC-MCNC: 3.7 G/DL (ref 1.5–4.5)
GLUCOSE SERPL-MCNC: 133 MG/DL (ref 65–99)
HCT VFR BLD AUTO: 48.6 % (ref 37.5–51)
HDLC SERPL-MCNC: 51 MG/DL
HGB BLD-MCNC: 16.6 G/DL (ref 13–17.7)
IMM GRANULOCYTES # BLD AUTO: 0 X10E3/UL (ref 0–0.1)
IMM GRANULOCYTES NFR BLD AUTO: 0 %
LDLC SERPL CALC-MCNC: 36 MG/DL (ref 0–99)
LYMPHOCYTES # BLD AUTO: 2.4 X10E3/UL (ref 0.7–3.1)
LYMPHOCYTES NFR BLD AUTO: 36 %
MCH RBC QN AUTO: 31 PG (ref 26.6–33)
MCHC RBC AUTO-ENTMCNC: 34.2 G/DL (ref 31.5–35.7)
MCV RBC AUTO: 91 FL (ref 79–97)
MICROALBUMIN UR-MCNC: 25.6 UG/ML
MONOCYTES # BLD AUTO: 0.4 X10E3/UL (ref 0.1–0.9)
MONOCYTES NFR BLD AUTO: 6 %
NEUTROPHILS # BLD AUTO: 3.5 X10E3/UL (ref 1.4–7)
NEUTROPHILS NFR BLD AUTO: 55 %
PLATELET # BLD AUTO: 201 X10E3/UL (ref 150–450)
POTASSIUM SERPL-SCNC: 4.5 MMOL/L (ref 3.5–5.2)
PROT SERPL-MCNC: 8.7 G/DL (ref 6–8.5)
PSA SERPL-MCNC: 0.5 NG/ML (ref 0–4)
RBC # BLD AUTO: 5.35 X10E6/UL (ref 4.14–5.8)
SODIUM SERPL-SCNC: 140 MMOL/L (ref 134–144)
T3FREE SERPL-MCNC: 4.2 PG/ML (ref 2–4.4)
T4 FREE SERPL-MCNC: 1.19 NG/DL (ref 0.82–1.77)
TRIGL SERPL-MCNC: 135 MG/DL (ref 0–149)
TSH SERPL DL<=0.005 MIU/L-ACNC: 2.67 UIU/ML (ref 0.45–4.5)
VIT B12 SERPL-MCNC: 454 PG/ML (ref 232–1245)
VLDLC SERPL CALC-MCNC: 23 MG/DL (ref 5–40)
WBC # BLD AUTO: 6.5 X10E3/UL (ref 3.4–10.8)

## 2022-02-21 RX ORDER — LISINOPRIL 20 MG/1
TABLET ORAL
Qty: 90 TABLET | Refills: 0 | Status: SHIPPED | OUTPATIENT
Start: 2022-02-21

## 2022-04-08 RX ORDER — ROSUVASTATIN CALCIUM 10 MG/1
TABLET, COATED ORAL
Qty: 30 TABLET | Refills: 0 | OUTPATIENT
Start: 2022-04-08

## 2023-08-11 ENCOUNTER — OFFICE VISIT (OUTPATIENT)
Dept: FAMILY MEDICINE CLINIC | Facility: CLINIC | Age: 53
End: 2023-08-11
Payer: COMMERCIAL

## 2023-08-11 VITALS
DIASTOLIC BLOOD PRESSURE: 90 MMHG | SYSTOLIC BLOOD PRESSURE: 128 MMHG | WEIGHT: 232 LBS | RESPIRATION RATE: 16 BRPM | HEART RATE: 90 BPM | BODY MASS INDEX: 32.48 KG/M2 | HEIGHT: 71 IN | OXYGEN SATURATION: 99 % | TEMPERATURE: 97.3 F

## 2023-08-11 DIAGNOSIS — L02.91 ABSCESS: Primary | ICD-10-CM

## 2023-08-11 PROCEDURE — 99213 OFFICE O/P EST LOW 20 MIN: CPT | Performed by: NURSE PRACTITIONER

## 2023-08-11 RX ORDER — AMOXICILLIN AND CLAVULANATE POTASSIUM 875; 125 MG/1; MG/1
1 TABLET, FILM COATED ORAL 2 TIMES DAILY
Qty: 14 TABLET | Refills: 0 | Status: SHIPPED | OUTPATIENT
Start: 2023-08-11 | End: 2023-08-21

## 2023-08-11 NOTE — PROGRESS NOTES
Subjective   Juan M Collins is a 53 y.o. male.     History of Present Illness   Groin Pain (Abscess x 1 week or more, worried about infection.  It has opened and drained.  Not painful but uncomfortable.)Answers submitted by the patient for this visit:  Primary Reason for Visit (Submitted on 8/11/2023)  What is the primary reason for your visit?: Other  Other (Submitted on 8/11/2023)  Please describe your symptoms.: Abyss in groin  Have you had these symptoms before?: Yes  How long have you been having these symptoms?: 5-7 days  Patient has pictures of area including today since it has drained.  No surrounding erythema noted and no current drainage.  Approximately 4-5 mm opening noted.    The following portions of the patient's history were reviewed and updated as appropriate: allergies, current medications, past family history, past medical history, past social history, past surgical history, and problem list.    Review of Systems   Constitutional:  Negative for fatigue.   Respiratory:  Negative for cough and shortness of breath.    Cardiovascular:  Negative for chest pain and palpitations.   Skin:  Negative for rash.   Psychiatric/Behavioral:  Negative for dysphoric mood and sleep disturbance. The patient is not nervous/anxious.      Objective   Physical Exam  Vitals and nursing note reviewed.   Constitutional:       Appearance: He is well-developed.   Cardiovascular:      Rate and Rhythm: Normal rate.   Pulmonary:      Effort: Pulmonary effort is normal.   Skin:     General: Skin is warm and dry.   Neurological:      Mental Status: He is alert and oriented to person, place, and time.   Psychiatric:         Mood and Affect: Mood normal.         Behavior: Behavior normal.         Thought Content: Thought content normal.         Judgment: Judgment normal.       Assessment & Plan   Diagnoses and all orders for this visit:    1. Abscess (Primary)  Comments:  improved  Orders:  -     amoxicillin-clavulanate  (AUGMENTIN) 875-125 MG per tablet; Take 1 tablet by mouth 2 (Two) Times a Day for 10 days.  Dispense: 14 tablet; Refill: 0

## 2024-03-25 ENCOUNTER — OFFICE VISIT (OUTPATIENT)
Dept: FAMILY MEDICINE CLINIC | Facility: CLINIC | Age: 54
End: 2024-03-25
Payer: COMMERCIAL

## 2024-03-25 ENCOUNTER — HOSPITAL ENCOUNTER (OUTPATIENT)
Dept: GENERAL RADIOLOGY | Facility: HOSPITAL | Age: 54
Discharge: HOME | End: 2024-03-25
Admitting: PHYSICIAN ASSISTANT
Payer: COMMERCIAL

## 2024-03-25 VITALS
WEIGHT: 232 LBS | HEIGHT: 71 IN | HEART RATE: 84 BPM | OXYGEN SATURATION: 98 % | SYSTOLIC BLOOD PRESSURE: 140 MMHG | TEMPERATURE: 97.3 F | RESPIRATION RATE: 16 BRPM | DIASTOLIC BLOOD PRESSURE: 92 MMHG | BODY MASS INDEX: 32.48 KG/M2

## 2024-03-25 DIAGNOSIS — Z12.5 SCREENING PSA (PROSTATE SPECIFIC ANTIGEN): ICD-10-CM

## 2024-03-25 DIAGNOSIS — R93.89 ABNORMAL CXR: ICD-10-CM

## 2024-03-25 DIAGNOSIS — R94.31 ABNORMAL EKG: ICD-10-CM

## 2024-03-25 DIAGNOSIS — Z79.4 TYPE 2 DIABETES MELLITUS WITH HYPERGLYCEMIA, WITH LONG-TERM CURRENT USE OF INSULIN: Primary | ICD-10-CM

## 2024-03-25 DIAGNOSIS — R06.02 SHORTNESS OF BREATH: ICD-10-CM

## 2024-03-25 DIAGNOSIS — I87.2 VENOUS INSUFFICIENCY: ICD-10-CM

## 2024-03-25 DIAGNOSIS — E78.2 HYPERLIPIDEMIA, MIXED: ICD-10-CM

## 2024-03-25 DIAGNOSIS — E55.9 VITAMIN D DEFICIENCY: ICD-10-CM

## 2024-03-25 DIAGNOSIS — I10 ESSENTIAL HYPERTENSION: ICD-10-CM

## 2024-03-25 DIAGNOSIS — E11.65 TYPE 2 DIABETES MELLITUS WITH HYPERGLYCEMIA, WITH LONG-TERM CURRENT USE OF INSULIN: Primary | ICD-10-CM

## 2024-03-25 PROCEDURE — 71046 X-RAY EXAM CHEST 2 VIEWS: CPT

## 2024-03-25 PROCEDURE — 99214 OFFICE O/P EST MOD 30 MIN: CPT | Performed by: PHYSICIAN ASSISTANT

## 2024-03-25 NOTE — PROGRESS NOTES
"Subjective   Juan M Collins is a 54 y.o. male.     History of Present Illness    Since the last visit, he has overall felt fairly well.  He has  primary hypertension has not been on lisinopril for over a year blood pressures a bit elevated today [place] kidney functions to restart the lisinopril and planning 10 mg due to blood pressure today.  Type 2 diabetes with hyperglycemia previously on metformin XR and Ozempic and worked well need to obtain labs today including urine microalbumin and A1c along with kidney functions before restarting.  Also mixed hyperlipidemia will obtain LFTs before restarting the Crestor.  Noting heart rates under 100 we will not restart Toprol for now .  he has been compliant with current medications have reviewed them.  The patient denies medication side effects.  Will refill medications. /92   Pulse 84   Temp 97.3 °F (36.3 °C)   Resp 16   Ht 180.3 cm (71\")   Wt 105 kg (232 lb)   SpO2 98%   BMI 32.36 kg/m²   Seems to have issues getting deep breaths---3-6 mos---alternates lungs. No hx asthma.    Results for orders placed or performed in visit on 02/17/22   Comprehensive metabolic panel    Specimen: Blood   Result Value Ref Range    Glucose 133 (H) 65 - 99 mg/dL    BUN 9 6 - 24 mg/dL    Creatinine 0.86 0.76 - 1.27 mg/dL    eGFR Non African Am 101 >59 mL/min/1.73    eGFR African Am 117 >59 mL/min/1.73    BUN/Creatinine Ratio 10 9 - 20    Sodium 140 134 - 144 mmol/L    Potassium 4.5 3.5 - 5.2 mmol/L    Chloride 99 96 - 106 mmol/L    Total CO2 23 20 - 29 mmol/L    Calcium 10.0 8.7 - 10.2 mg/dL    Total Protein 8.7 (H) 6.0 - 8.5 g/dL    Albumin 5.0 4.0 - 5.0 g/dL    Globulin 3.7 1.5 - 4.5 g/dL    A/G Ratio 1.4 1.2 - 2.2    Total Bilirubin 2.1 (H) 0.0 - 1.2 mg/dL    Alkaline Phosphatase 77 44 - 121 IU/L    AST (SGOT) 31 0 - 40 IU/L    ALT (SGPT) 32 0 - 44 IU/L   Lipid panel    Specimen: Blood   Result Value Ref Range    Total Cholesterol 110 100 - 199 mg/dL    Triglycerides 135 0 - " 149 mg/dL    HDL Cholesterol 51 >39 mg/dL    VLDL Cholesterol Delbert 23 5 - 40 mg/dL    LDL Chol Calc (NIH) 36 0 - 99 mg/dL   TSH    Specimen: Blood   Result Value Ref Range    TSH 2.670 0.450 - 4.500 uIU/mL   PSA Screen    Specimen: Blood   Result Value Ref Range    PSA 0.5 0.0 - 4.0 ng/mL   T3, Free    Specimen: Blood   Result Value Ref Range    T3, Free 4.2 2.0 - 4.4 pg/mL   T4, Free    Specimen: Blood   Result Value Ref Range    Free T4 1.19 0.82 - 1.77 ng/dL   Vitamin D 25 Hydroxy    Specimen: Blood   Result Value Ref Range    25 Hydroxy, Vitamin D 32.2 30.0 - 100.0 ng/mL   Vitamin B12    Specimen: Blood   Result Value Ref Range    Vitamin B-12 454 232 - 1,245 pg/mL   Folate    Specimen: Blood   Result Value Ref Range    Folate 10.0 >3.0 ng/mL   Microalbumin / Creatinine Urine Ratio - Urine, Clean Catch    Specimen: Urine, Clean Catch   Result Value Ref Range    Creatinine, Urine 230.1 Not Estab. mg/dL    Microalbumin, Urine 25.6 Not Estab. ug/mL    Microalbumin/Creatinine Ratio 11 0 - 29 mg/g creat   CBC and Differential    Specimen: Blood   Result Value Ref Range    WBC 6.5 3.4 - 10.8 x10E3/uL    RBC 5.35 4.14 - 5.80 x10E6/uL    Hemoglobin 16.6 13.0 - 17.7 g/dL    Hematocrit 48.6 37.5 - 51.0 %    MCV 91 79 - 97 fL    MCH 31.0 26.6 - 33.0 pg    MCHC 34.2 31.5 - 35.7 g/dL    RDW 12.4 11.6 - 15.4 %    Platelets 201 150 - 450 x10E3/uL    Neutrophil Rel % 55 Not Estab. %    Lymphocyte Rel % 36 Not Estab. %    Monocyte Rel % 6 Not Estab. %    Eosinophil Rel % 2 Not Estab. %    Basophil Rel % 1 Not Estab. %    Neutrophils Absolute 3.5 1.4 - 7.0 x10E3/uL    Lymphocytes Absolute 2.4 0.7 - 3.1 x10E3/uL    Monocytes Absolute 0.4 0.1 - 0.9 x10E3/uL    Eosinophils Absolute 0.2 0.0 - 0.4 x10E3/uL    Basophils Absolute 0.1 0.0 - 0.2 x10E3/uL    Immature Granulocyte Rel % 0 Not Estab. %    Immature Grans Absolute 0.0 0.0 - 0.1 x10E3/uL     I previously had him on lisinopril 20 mg daily for hypertension and amlodipine that was  changed to Toprol-XL due to sinus tachycardia in 2022.  Crestor 10 mg daily for mixed hyperlipidemia and having diabetes, metformin XR and semaglutide  Patient has not seen me in over 2 years last visit 2/17/2022 and that was for hospital follow-up for cholecystitis.      patient had EKG x2 while inpatient and 1/23/2022 EKG read by Dr. Stringer with diagnosis sinus tachycardia, probable left atrial abnormality, poor R wave progression, left ventricular hypertrophy, nonspecific T wave flattening.  EKG performed again on 1/26/2022 and read by Dr. Chiu and diagnosed sinus tachycardia, left ventricular hypertrophy, lateral infarct, age indeterminate     CTA chest and CT abd/pelvis 1-23-22 I summarized this last visit noting distention of the gallbladder with significant gallbladder wall thickening and pericholecystic inflammatory stranding, spleen and adrenal pancreas normal.   I want to note on 2-18-21 stopped amlodipine and changed to Toprol XL 50mg     Has seen Dr Beatty GI 4-1-21 for indirect bilirubin;  Jason's  Pulse rate high and not taking Toprol and has been on lisinopril 20 mg daily for hypertension---prior notes  Note patient has not seen me for his type 2 diabetes, hypertension sinus tachycardia since 2/25/2021 and has to see me every 6 months to remain under my care.---not on any meds and no tachycardia  The following portions of the patient's history were reviewed and updated as appropriate: allergies, current medications, past family history, past medical history, past social history, past surgical history, and problem list.    Review of Systems   Constitutional:  Negative for unexpected weight loss.   Eyes:  Negative for blurred vision.   Respiratory:  Positive for shortness of breath. Negative for cough.    Endocrine: Positive for polydipsia and polyuria.   Neurological:  Negative for tremors, speech difficulty and confusion.       Objective   Physical Exam  Vitals and nursing note reviewed.    Constitutional:       General: He is not in acute distress.     Appearance: He is well-developed. He is not ill-appearing or diaphoretic.   HENT:      Head: Normocephalic.   Eyes:      General: No scleral icterus.     Conjunctiva/sclera: Conjunctivae normal.      Pupils: Pupils are equal, round, and reactive to light.   Cardiovascular:      Rate and Rhythm: Normal rate and regular rhythm.      Pulses: Normal pulses.      Heart sounds: Normal heart sounds. No murmur heard.  Pulmonary:      Effort: Pulmonary effort is normal.      Breath sounds: Normal breath sounds. No rales.   Musculoskeletal:         General: Normal range of motion.      Cervical back: Normal range of motion and neck supple.      Right lower leg: Edema present.      Left lower leg: Edema present.      Comments: Trace pedal edema and hemosiderin staining on lower extremity   Skin:     General: Skin is warm and dry.      Findings: No rash.   Neurological:      Mental Status: He is alert and oriented to person, place, and time.   Psychiatric:         Mood and Affect: Mood normal. Affect is not inappropriate.         Behavior: Behavior normal.         Thought Content: Thought content normal.         Judgment: Judgment normal.           Assessment & Plan   Diagnoses and all orders for this visit:    1. Type 2 diabetes mellitus with hyperglycemia, with long-term current use of insulin (Primary)  -     Comprehensive metabolic panel  -     Lipid panel  -     CBC and Differential  -     TSH  -     Hemoglobin A1c  -     T4, Free  -     Urinalysis With Microscopic - Urine, Clean Catch  -     Vitamin D,25-Hydroxy  -     Vitamin B12  -     Folate  -     Microalbumin / Creatinine Urine Ratio - Urine, Clean Catch  -     PSA Screen    2. Essential hypertension  -     Comprehensive metabolic panel  -     Lipid panel  -     CBC and Differential  -     TSH  -     Hemoglobin A1c  -     T4, Free  -     Urinalysis With Microscopic - Urine, Clean Catch  -     Vitamin  D,25-Hydroxy  -     Vitamin B12  -     Folate  -     Microalbumin / Creatinine Urine Ratio - Urine, Clean Catch  -     PSA Screen    3. Vitamin D deficiency  -     Comprehensive metabolic panel  -     Lipid panel  -     CBC and Differential  -     TSH  -     Hemoglobin A1c  -     T4, Free  -     Urinalysis With Microscopic - Urine, Clean Catch  -     Vitamin D,25-Hydroxy  -     Vitamin B12  -     Folate  -     Microalbumin / Creatinine Urine Ratio - Urine, Clean Catch  -     PSA Screen    4. Hyperlipidemia, mixed  -     Comprehensive metabolic panel  -     Lipid panel  -     CBC and Differential  -     TSH  -     Hemoglobin A1c  -     T4, Free  -     Urinalysis With Microscopic - Urine, Clean Catch  -     Vitamin D,25-Hydroxy  -     Vitamin B12  -     Folate  -     Microalbumin / Creatinine Urine Ratio - Urine, Clean Catch  -     PSA Screen    5. Screening PSA (prostate specific antigen)  -     Comprehensive metabolic panel  -     Lipid panel  -     CBC and Differential  -     TSH  -     Hemoglobin A1c  -     T4, Free  -     Urinalysis With Microscopic - Urine, Clean Catch  -     Vitamin D,25-Hydroxy  -     Vitamin B12  -     Folate  -     Microalbumin / Creatinine Urine Ratio - Urine, Clean Catch  -     PSA Screen    6. Shortness of breath  -     Ambulatory Referral to Cardiology  -     XR Chest PA & Lateral; Future  -     XR Chest PA & Lateral    7. Abnormal EKG  -     Ambulatory Referral to Cardiology    8. Venous insufficiency  Comments:  start lower ext stockings        SOA----get CXR-- PFTs for this sensation none and unable to get deep breath  Also want him to see cardiology from the 2022 abnormal EKG and his feeling of cannot get deep breath  Update labs and will likely restart the Crestor 10 mg daily, metformin  mg daily, lisinopril 10 mg daily, possibly Toprol  And Ozempic  Also noted some venous stasis pigmentation macules on his tib-fib and have him start knee-high compression stockings  Plan,  Juan M Collins, was seen today.  he was seen for  primary hypertension and plan to restart lisinopril 10 mg daily after obtaining labs to check renal functions today and check potassium.  Type 2 diabetes with hyperglycemia plan to restart metformin and possible Ozempic awaiting lab results as well to check kidney functions and A1c, mixed hyperlipidemia will plan to restart generic Crestor but need liver enzymes first .         Answers submitted by the patient for this visit:  Primary Reason for Visit (Submitted on 3/18/2024)  What is the primary reason for your visit?: Diabetes

## 2024-03-27 LAB
25(OH)D3+25(OH)D2 SERPL-MCNC: 29.5 NG/ML (ref 30–100)
ALBUMIN SERPL-MCNC: 4.5 G/DL (ref 3.8–4.9)
ALBUMIN/CREAT UR: 6 MG/G CREAT (ref 0–29)
ALBUMIN/GLOB SERPL: 1.9 {RATIO} (ref 1.2–2.2)
ALP SERPL-CCNC: 104 IU/L (ref 44–121)
ALT SERPL-CCNC: 37 IU/L (ref 0–44)
APPEARANCE UR: CLEAR
AST SERPL-CCNC: 22 IU/L (ref 0–40)
BACTERIA #/AREA URNS HPF: NORMAL /[HPF]
BASOPHILS # BLD AUTO: 0 X10E3/UL (ref 0–0.2)
BASOPHILS NFR BLD AUTO: 1 %
BILIRUB SERPL-MCNC: 2 MG/DL (ref 0–1.2)
BILIRUB UR QL STRIP: NEGATIVE
BUN SERPL-MCNC: 11 MG/DL (ref 6–24)
BUN/CREAT SERPL: 13 (ref 9–20)
CALCIUM SERPL-MCNC: 9.6 MG/DL (ref 8.7–10.2)
CASTS URNS QL MICRO: NORMAL /LPF
CHLORIDE SERPL-SCNC: 99 MMOL/L (ref 96–106)
CHOLEST SERPL-MCNC: 175 MG/DL (ref 100–199)
CO2 SERPL-SCNC: 22 MMOL/L (ref 20–29)
COLOR UR: YELLOW
CREAT SERPL-MCNC: 0.86 MG/DL (ref 0.76–1.27)
CREAT UR-MCNC: 58.1 MG/DL
EGFRCR SERPLBLD CKD-EPI 2021: 104 ML/MIN/1.73
EOSINOPHIL # BLD AUTO: 0.1 X10E3/UL (ref 0–0.4)
EOSINOPHIL NFR BLD AUTO: 2 %
EPI CELLS #/AREA URNS HPF: NORMAL /HPF (ref 0–10)
ERYTHROCYTE [DISTWIDTH] IN BLOOD BY AUTOMATED COUNT: 12.6 % (ref 11.6–15.4)
FOLATE SERPL-MCNC: 18.1 NG/ML
GLOBULIN SER CALC-MCNC: 2.4 G/DL (ref 1.5–4.5)
GLUCOSE SERPL-MCNC: 340 MG/DL (ref 70–99)
GLUCOSE UR QL STRIP: ABNORMAL
HBA1C MFR BLD: 11.7 % (ref 4.8–5.6)
HCT VFR BLD AUTO: 49.8 % (ref 37.5–51)
HDLC SERPL-MCNC: 39 MG/DL
HGB BLD-MCNC: 17.3 G/DL (ref 13–17.7)
HGB UR QL STRIP: NEGATIVE
IMM GRANULOCYTES # BLD AUTO: 0 X10E3/UL (ref 0–0.1)
IMM GRANULOCYTES NFR BLD AUTO: 0 %
KETONES UR QL STRIP: NEGATIVE
LDLC SERPL CALC-MCNC: 70 MG/DL (ref 0–99)
LEUKOCYTE ESTERASE UR QL STRIP: NEGATIVE
LYMPHOCYTES # BLD AUTO: 1.8 X10E3/UL (ref 0.7–3.1)
LYMPHOCYTES NFR BLD AUTO: 35 %
MCH RBC QN AUTO: 32.6 PG (ref 26.6–33)
MCHC RBC AUTO-ENTMCNC: 34.7 G/DL (ref 31.5–35.7)
MCV RBC AUTO: 94 FL (ref 79–97)
MICRO URNS: ABNORMAL
MICRO URNS: ABNORMAL
MICROALBUMIN UR-MCNC: 3.3 UG/ML
MONOCYTES # BLD AUTO: 0.4 X10E3/UL (ref 0.1–0.9)
MONOCYTES NFR BLD AUTO: 8 %
NEUTROPHILS # BLD AUTO: 2.8 X10E3/UL (ref 1.4–7)
NEUTROPHILS NFR BLD AUTO: 54 %
NITRITE UR QL STRIP: NEGATIVE
PH UR STRIP: 5.5 [PH] (ref 5–7.5)
PLATELET # BLD AUTO: 138 X10E3/UL (ref 150–450)
POTASSIUM SERPL-SCNC: 4.4 MMOL/L (ref 3.5–5.2)
PROT SERPL-MCNC: 6.9 G/DL (ref 6–8.5)
PROT UR QL STRIP: NEGATIVE
PSA SERPL-MCNC: 0.4 NG/ML (ref 0–4)
RBC # BLD AUTO: 5.3 X10E6/UL (ref 4.14–5.8)
RBC #/AREA URNS HPF: NORMAL /HPF (ref 0–2)
SODIUM SERPL-SCNC: 138 MMOL/L (ref 134–144)
SP GR UR STRIP: >=1.03 (ref 1–1.03)
T4 FREE SERPL-MCNC: 1.26 NG/DL (ref 0.82–1.77)
TRIGL SERPL-MCNC: 420 MG/DL (ref 0–149)
TSH SERPL DL<=0.005 MIU/L-ACNC: 2.29 UIU/ML (ref 0.45–4.5)
UROBILINOGEN UR STRIP-MCNC: 0.2 MG/DL (ref 0.2–1)
VIT B12 SERPL-MCNC: 650 PG/ML (ref 232–1245)
VLDLC SERPL CALC-MCNC: 66 MG/DL (ref 5–40)
WBC # BLD AUTO: 5.1 X10E3/UL (ref 3.4–10.8)
WBC #/AREA URNS HPF: NORMAL /HPF (ref 0–5)

## 2024-03-27 RX ORDER — LISINOPRIL 10 MG/1
10 TABLET ORAL DAILY
Qty: 30 TABLET | Refills: 1 | Status: SHIPPED | OUTPATIENT
Start: 2024-03-27

## 2024-03-27 RX ORDER — GLIMEPIRIDE 2 MG/1
2 TABLET ORAL 2 TIMES DAILY WITH MEALS
Qty: 60 TABLET | Refills: 1 | Status: SHIPPED | OUTPATIENT
Start: 2024-03-27

## 2024-03-27 RX ORDER — METFORMIN HYDROCHLORIDE 500 MG/1
TABLET, EXTENDED RELEASE ORAL
Qty: 60 TABLET | Refills: 1 | Status: SHIPPED | OUTPATIENT
Start: 2024-03-27

## 2024-03-27 RX ORDER — ROSUVASTATIN CALCIUM 10 MG/1
10 TABLET, COATED ORAL DAILY
Qty: 30 TABLET | Refills: 11 | Status: SHIPPED | OUTPATIENT
Start: 2024-03-27

## 2024-04-05 ENCOUNTER — PRIOR AUTHORIZATION (OUTPATIENT)
Dept: FAMILY MEDICINE CLINIC | Facility: CLINIC | Age: 54
End: 2024-04-05
Payer: COMMERCIAL

## 2024-04-05 NOTE — TELEPHONE ENCOUNTER
Ozempic (0.25 or 0.5 MG/DOSE) 2MG/3ML pen-injectors    Key: [id number]  Rx #: [id number]    PA sent to plan today

## 2024-04-17 RX ORDER — AMLODIPINE BESYLATE 2.5 MG/1
2.5 TABLET ORAL DAILY
Qty: 30 TABLET | Refills: 0 | Status: SHIPPED | OUTPATIENT
Start: 2024-04-17 | End: 2024-04-18 | Stop reason: SDUPTHER

## 2024-04-18 ENCOUNTER — OFFICE VISIT (OUTPATIENT)
Dept: FAMILY MEDICINE CLINIC | Facility: CLINIC | Age: 54
End: 2024-04-18
Payer: COMMERCIAL

## 2024-04-18 VITALS
SYSTOLIC BLOOD PRESSURE: 142 MMHG | RESPIRATION RATE: 16 BRPM | TEMPERATURE: 97.3 F | HEIGHT: 71 IN | WEIGHT: 232 LBS | BODY MASS INDEX: 32.48 KG/M2 | DIASTOLIC BLOOD PRESSURE: 92 MMHG | OXYGEN SATURATION: 98 % | HEART RATE: 77 BPM

## 2024-04-18 DIAGNOSIS — I10 ESSENTIAL HYPERTENSION: ICD-10-CM

## 2024-04-18 DIAGNOSIS — E78.2 HYPERLIPIDEMIA, MIXED: ICD-10-CM

## 2024-04-18 DIAGNOSIS — E55.9 VITAMIN D DEFICIENCY: ICD-10-CM

## 2024-04-18 DIAGNOSIS — E11.65 TYPE 2 DIABETES MELLITUS WITH HYPERGLYCEMIA, WITH LONG-TERM CURRENT USE OF INSULIN: Primary | ICD-10-CM

## 2024-04-18 DIAGNOSIS — Z79.4 TYPE 2 DIABETES MELLITUS WITH HYPERGLYCEMIA, WITH LONG-TERM CURRENT USE OF INSULIN: Primary | ICD-10-CM

## 2024-04-18 PROCEDURE — 99214 OFFICE O/P EST MOD 30 MIN: CPT | Performed by: PHYSICIAN ASSISTANT

## 2024-04-18 RX ORDER — AMLODIPINE BESYLATE 2.5 MG/1
2.5 TABLET ORAL DAILY
Qty: 90 TABLET | Refills: 1 | Status: SHIPPED | OUTPATIENT
Start: 2024-04-18

## 2024-04-18 RX ORDER — LISINOPRIL 20 MG/1
20 TABLET ORAL DAILY
Qty: 90 TABLET | Refills: 1 | Status: SHIPPED | OUTPATIENT
Start: 2024-04-18

## 2024-04-18 RX ORDER — ROSUVASTATIN CALCIUM 10 MG/1
10 TABLET, COATED ORAL DAILY
Qty: 90 TABLET | Refills: 3 | Status: SHIPPED | OUTPATIENT
Start: 2024-04-18

## 2024-04-18 RX ORDER — SEMAGLUTIDE 1.34 MG/ML
INJECTION, SOLUTION SUBCUTANEOUS
Qty: 3 ML | Refills: 5 | Status: SHIPPED | OUTPATIENT
Start: 2024-04-18

## 2024-04-18 RX ORDER — METFORMIN HYDROCHLORIDE 500 MG/1
TABLET, EXTENDED RELEASE ORAL
Qty: 180 TABLET | Refills: 1 | Status: SHIPPED | OUTPATIENT
Start: 2024-04-18

## 2024-04-18 NOTE — PROGRESS NOTES
"Subjective   Juan M Collins is a 54 y.o. male.     Diabetes  Pertinent negatives for hypoglycemia include no confusion, speech difficulty or tremors. Pertinent negatives for diabetes include no blurred vision, no polydipsia, no polyuria and no weight loss.       Since the last visit, he has overall felt well.  He has Primary Hypertension not at goal, plan to add/adjust medication, Vitamin D deficiency and labs are at goal >30 ng/mL, and saw me on 3/25/2024 and had labs and I noted . Hemoglobin A1c is the highest its ever been at 11.7%.  Plan to restart his medications as discussed yesterday, lisinopril 10 mg daily for the hypertension, metformin and Ozempic for the uncontrolled type 2 diabetes.  Also restart Crestor due to the diabetes.  Triglycerides are over 400 and that is also from the elevated glucose.  Glucose is over 300.  Will have to start glimepiride in addition to all the other medications due to the severity of the glucose elevation.  Glimepiride must be taken with food and can cause hypoglycemia, low blood sugar.  If you do not have a glucometer to check your glucose message me back.  patient must keep appointment.  Follow-up labs are required.  No microalbumin protein in the urine.  Will also need follow-up platelets on future lab.  Other labs in acceptable range  he has been compliant with current medications have reviewed them.  The patient's been having nausea with the glimepiride and this started before he went on the Ozempic.  He is also back on the metformin.  I restarted the blood pressure meds and following up today noting blood pressure still mildly elevated and will adjust medication. will refill medications. /92   Pulse 77   Temp 97.3 °F (36.3 °C)   Resp 16   Ht 180.3 cm (71\")   Wt 105 kg (232 lb)   SpO2 98%   BMI 32.36 kg/m² .      No more SOA!!!  138/96 ave bp at home with adding back meds    Results for orders placed or performed in visit on 03/25/24   Comprehensive " metabolic panel    Specimen: Blood    Blood  Release to jordana   Result Value Ref Range    Glucose 340 (H) 70 - 99 mg/dL    BUN 11 6 - 24 mg/dL    Creatinine 0.86 0.76 - 1.27 mg/dL    EGFR Result 104 >59 mL/min/1.73    BUN/Creatinine Ratio 13 9 - 20    Sodium 138 134 - 144 mmol/L    Potassium 4.4 3.5 - 5.2 mmol/L    Chloride 99 96 - 106 mmol/L    Total CO2 22 20 - 29 mmol/L    Calcium 9.6 8.7 - 10.2 mg/dL    Total Protein 6.9 6.0 - 8.5 g/dL    Albumin 4.5 3.8 - 4.9 g/dL    Globulin 2.4 1.5 - 4.5 g/dL    A/G Ratio 1.9 1.2 - 2.2    Total Bilirubin 2.0 (H) 0.0 - 1.2 mg/dL    Alkaline Phosphatase 104 44 - 121 IU/L    AST (SGOT) 22 0 - 40 IU/L    ALT (SGPT) 37 0 - 44 IU/L   Lipid panel    Specimen: Blood    Blood  Release to jordana   Result Value Ref Range    Total Cholesterol 175 100 - 199 mg/dL    Triglycerides 420 (H) 0 - 149 mg/dL    HDL Cholesterol 39 (L) >39 mg/dL    VLDL Cholesterol Delbert 66 (H) 5 - 40 mg/dL    LDL Chol Calc (NIH) 70 0 - 99 mg/dL   TSH    Specimen: Blood    Blood  Release to jordana   Result Value Ref Range    TSH 2.290 0.450 - 4.500 uIU/mL   Hemoglobin A1c    Specimen: Blood    Blood  Release to jordana   Result Value Ref Range    Hemoglobin A1C 11.7 (H) 4.8 - 5.6 %   T4, Free    Specimen: Blood    Blood  Release to jordana   Result Value Ref Range    Free T4 1.26 0.82 - 1.77 ng/dL   Vitamin D,25-Hydroxy    Specimen: Blood    Blood  Release to jordana   Result Value Ref Range    25 Hydroxy, Vitamin D 29.5 (L) 30.0 - 100.0 ng/mL   Vitamin B12    Specimen: Blood    Blood  Release to jordana   Result Value Ref Range    Vitamin B-12 650 232 - 1,245 pg/mL   Folate    Specimen: Blood    Blood  Release to jordana   Result Value Ref Range    Folate 18.1 >3.0 ng/mL   Microalbumin / Creatinine Urine Ratio - Urine, Clean Catch    Specimen: Urine, Clean Catch    Blood  Release to jordana   Result Value Ref Range    Creatinine, Urine 58.1 Not Estab. mg/dL    Microalbumin, Urine 3.3 Not Estab. ug/mL    Microalbumin/Creatinine Ratio 6 0  - 29 mg/g creat   PSA Screen    Specimen: Blood    Blood  Release to jordana   Result Value Ref Range    PSA 0.4 0.0 - 4.0 ng/mL   Microscopic Examination -    Blood  Release to jordana   Result Value Ref Range    WBC, UA None seen 0 - 5 /hpf    RBC, UA None seen 0 - 2 /hpf    Epithelial Cells (non renal) None seen 0 - 10 /hpf    Casts None seen None seen /lpf    Bacteria, UA None seen None seen/Few   CBC and Differential    Specimen: Blood    Blood  Release to jordana   Result Value Ref Range    WBC 5.1 3.4 - 10.8 x10E3/uL    RBC 5.30 4.14 - 5.80 x10E6/uL    Hemoglobin 17.3 13.0 - 17.7 g/dL    Hematocrit 49.8 37.5 - 51.0 %    MCV 94 79 - 97 fL    MCH 32.6 26.6 - 33.0 pg    MCHC 34.7 31.5 - 35.7 g/dL    RDW 12.6 11.6 - 15.4 %    Platelets 138 (L) 150 - 450 x10E3/uL    Neutrophil Rel % 54 Not Estab. %    Lymphocyte Rel % 35 Not Estab. %    Monocyte Rel % 8 Not Estab. %    Eosinophil Rel % 2 Not Estab. %    Basophil Rel % 1 Not Estab. %    Neutrophils Absolute 2.8 1.4 - 7.0 x10E3/uL    Lymphocytes Absolute 1.8 0.7 - 3.1 x10E3/uL    Monocytes Absolute 0.4 0.1 - 0.9 x10E3/uL    Eosinophils Absolute 0.1 0.0 - 0.4 x10E3/uL    Basophils Absolute 0.0 0.0 - 0.2 x10E3/uL    Immature Granulocyte Rel % 0 Not Estab. %    Immature Grans Absolute 0.0 0.0 - 0.1 x10E3/uL   Urinalysis With Microscopic - Urine, Clean Catch    Specimen: Urine, Clean Catch    Blood  Release to jordana   Result Value Ref Range    Specific Gravity, UA      >=1.030 (A) 1.005 - 1.030    pH, UA 5.5 5.0 - 7.5    Color, UA Yellow Yellow    Appearance, UA Clear Clear    Leukocytes, UA Negative Negative    Protein Negative Negative/Trace    Glucose, UA 3+ (A) Negative    Ketones Negative Negative    Blood, UA Negative Negative    Bilirubin, UA Negative Negative    Urobilinogen, UA 0.2 0.2 - 1.0 mg/dL    Nitrite, UA Negative Negative    Microscopic Examination Comment     Microscopic Examination See below:    Hemoglobin A1c is the highest its ever been at 11.7%.  Plan to  restart his medications as discussed yesterday, lisinopril 10 mg daily for the hypertension, metformin and Ozempic for the uncontrolled type 2 diabetes.  Also restart Crestor due to the diabetes.  Triglycerides are over 400 and that is also from the elevated glucose.  Glucose is over 300.  Will have to start glimepiride in addition to all the other medications due to the severity of the glucose elevation.  Glimepiride must be taken with food and can cause hypoglycemia, low blood sugar.  If you do not have a glucometer to check your glucose message me back.  patient must keep appointment.  Follow-up labs are required.  No microalbumin protein in the urine.  Will also need follow-up platelets on future lab.  Other labs in acceptable range   Also last visit    SOA----get CXR-- PFTs for this sensation none and unable to get deep breath  Also want him to see cardiology from the 2022 abnormal EKG and his feeling of cannot get deep breath   patient had EKG x2 while inpatient and 1/23/2022 EKG read by Dr. Stringer with diagnosis sinus tachycardia, probable left atrial abnormality, poor R wave progression, left ventricular hypertrophy, nonspecific T wave flattening.  EKG performed again on 1/26/2022 and read by Dr. Chiu and diagnosed sinus tachycardia, left ventricular hypertrophy, lateral infarct, age indeterminate     CTA chest and CT abd/pelvis 1-23-22 I summarized this last visit noting distention of the gallbladder with significant gallbladder wall thickening and pericholecystic inflammatory stranding, spleen and adrenal pancreas normal.   I want to note on 2-18-21 stopped amlodipine and changed to Toprol XL 50mg     Has seen Dr Beatty GI 4-1-21 for indirect bilirubin;  Jason's  Pulse rate high and not taking Toprol and has been on lisinopril 20 mg daily for hypertension---prior notes  Note patient has not seen me for his type 2 diabetes, hypertension sinus tachycardia since 2/25/2021  He has appt cardio 4-25-24          Radiologist notes your right hemidiaphragm is elevated and this is not on prior imaging.----This can happen during anesthesia..  I am not sure why it happened to your right lung.  I still want you to do the pulmonary function testing that I ordered and want you to see a pulmonologist for further evaluation.  Scarring also noted in your right lung   Ave glucose 198 fasting-----  The following portions of the patient's history were reviewed and updated as appropriate: allergies, current medications, past family history, past medical history, past social history, past surgical history, and problem list.    Review of Systems   Constitutional:  Negative for unexpected weight loss.   Eyes:  Negative for blurred vision.   Gastrointestinal:  Positive for abdominal distention and nausea.   Endocrine: Negative for polydipsia and polyuria.   Neurological:  Negative for tremors, speech difficulty and confusion.       Objective   Physical Exam  Vitals and nursing note reviewed.   Constitutional:       General: He is not in acute distress.     Appearance: He is well-developed. He is not diaphoretic.   HENT:      Head: Normocephalic.   Eyes:      Conjunctiva/sclera: Conjunctivae normal.      Pupils: Pupils are equal, round, and reactive to light.   Neck:      Vascular: No carotid bruit.   Cardiovascular:      Rate and Rhythm: Normal rate and regular rhythm.      Pulses: Normal pulses.      Heart sounds: Normal heart sounds. No murmur heard.  Pulmonary:      Effort: Pulmonary effort is normal.      Breath sounds: Normal breath sounds. No rales.   Musculoskeletal:         General: Normal range of motion.      Cervical back: Normal range of motion and neck supple.      Right lower leg: No edema.      Left lower leg: No edema.   Skin:     General: Skin is warm and dry.      Findings: No rash.   Neurological:      Mental Status: He is alert and oriented to person, place, and time.   Psychiatric:         Mood and Affect: Mood normal.  Affect is not inappropriate.         Behavior: Behavior normal.         Thought Content: Thought content normal.         Judgment: Judgment normal.           Assessment & Plan   Diagnoses and all orders for this visit:    1. Type 2 diabetes mellitus with hyperglycemia, with long-term current use of insulin (Primary)  -     Comprehensive Metabolic Panel    2. Essential hypertension  -     Comprehensive Metabolic Panel    3. Vitamin D deficiency    4. Hyperlipidemia, mixed    Other orders  -     lisinopril (PRINIVIL,ZESTRIL) 20 MG tablet; Take 1 tablet by mouth Daily. For BP, new dose  Dispense: 90 tablet; Refill: 1  -     amLODIPine (NORVASC) 2.5 MG tablet; Take 1 tablet by mouth Daily. For BP with lisinopril  Dispense: 90 tablet; Refill: 1  -     metFORMIN ER (GLUCOPHAGE-XR) 500 MG 24 hr tablet; 2 p.o. daily with food for type 2 diabetes  Dispense: 180 tablet; Refill: 1  -     rosuvastatin (Crestor) 10 MG tablet; Take 1 tablet by mouth Daily. For cholesterol  Dispense: 90 tablet; Refill: 3  -     Semaglutide,0.25 or 0.5MG/DOS, (Ozempic, 0.25 or 0.5 MG/DOSE,) 2 MG/1.5ML solution pen-injector; Inject 0.5mg subcutaneously once weekly for DMII  Dispense: 3 mL; Refill: 5      Plan to raise Ozempic 2.5 mg tolerating well so far and continue metformin 2 tabs daily  Cardio  consult due to abnormal EKG in 2022 at [place] is 4-25-24  Ok reduce Amaryl--- --to once daily---nausea  Plan, Juan M Collins, was seen today.  he was seen for HTN and add/adjust medication, DMII and adjusted/added medication, Hyperlipidemia with new medication plan, and Vitamin D deficiency and will update labs .  With starting several medications in the last few weeks we will update labs to check kidney and liver function  Plan labs to include lipids and A1c at the end of June  Need eye exam       Answers submitted by the patient for this visit:  Primary Reason for Visit (Submitted on 4/11/2024)  What is the primary reason for your visit?:  Diabetes  Diabetes Questionnaire (Submitted on 4/11/2024)  Chief Complaint: Diabetes problem  Below 70: never

## 2024-04-18 NOTE — PATIENT INSTRUCTIONS
Diabetes Mellitus Basics    Diabetes mellitus, or diabetes, is a long-term (chronic) disease. It occurs when the body does not properly use sugar (glucose) that is released from food after you eat.  Diabetes mellitus may be caused by one or both of these problems:  Your pancreas does not make enough of a hormone called insulin.  Your body does not react in a normal way to the insulin that it makes.  Insulin lets glucose enter cells in your body. This gives you energy. If you have diabetes, glucose cannot get into cells. This causes high blood glucose (hyperglycemia).  How to treat and manage diabetes  You may need to take insulin or other diabetes medicines daily to keep your glucose in balance. If you are prescribed insulin, you will learn how to give yourself insulin by injection. You may need to adjust the amount of insulin you take based on the foods that you eat.  You will need to check your blood glucose levels using a glucose monitor as told by your health care provider. The readings can help determine if you have low or high blood glucose.  Generally, you should have these blood glucose levels:  Before meals (preprandial):  mg/dL (4.4-7.2 mmol/L).  After meals (postprandial): below 180 mg/dL (10 mmol/L).  Hemoglobin A1c (HbA1c) level: less than 7%.  Your health care provider will set treatment goals for you.  Keep all follow-up visits. This is important.  Follow these instructions at home:  Diabetes medicines  Take your diabetes medicines every day as told by your health care provider. List your diabetes medicines here:  Name of medicine: ______________________________  Amount (dose): _______________ Time (a.m./p.m.): _______________ Notes: ___________________________________  Name of medicine: ______________________________  Amount (dose): _______________ Time (a.m./p.m.): _______________ Notes: ___________________________________  Name of medicine: ______________________________  Amount (dose):  _______________ Time (a.m./p.m.): _______________ Notes: ___________________________________  Insulin  If you use insulin, list the types of insulin you use here:  Insulin type: ______________________________  Amount (dose): _______________ Time (a.m./p.m.): _______________Notes: ___________________________________  Insulin type: ______________________________  Amount (dose): _______________ Time (a.m./p.m.): _______________ Notes: ___________________________________  Insulin type: ______________________________  Amount (dose): _______________ Time (a.m./p.m.): _______________ Notes: ___________________________________  Insulin type: ______________________________  Amount (dose): _______________ Time (a.m./p.m.): _______________ Notes: ___________________________________  Insulin type: ______________________________  Amount (dose): _______________ Time (a.m./p.m.): _______________ Notes: ___________________________________  Managing blood glucose    Check your blood glucose levels using a glucose monitor as told by your health care provider.  Write down the times that you check your glucose levels here:  Time: _______________ Notes: ___________________________________  Time: _______________ Notes: ___________________________________  Time: _______________ Notes: ___________________________________  Time: _______________ Notes: ___________________________________  Time: _______________ Notes: ___________________________________  Time: _______________ Notes: ___________________________________    Low blood glucose  Low blood glucose (hypoglycemia) is when glucose is at or below 70 mg/dL (3.9 mmol/L). Symptoms may include:  Feeling:  Hungry.  Sweaty and clammy.  Irritable or easily upset.  Dizzy.  Sleepy.  Having:  A fast heartbeat.  A headache.  A change in your vision.  Numbness around the mouth, lips, or tongue.  Having trouble with:  Moving (coordination).  Sleeping.  Treating low blood glucose  To treat low blood  glucose, eat or drink something containing sugar right away. If you can think clearly and swallow safely, follow the 15:15 rule:  Take 15 grams of a fast-acting carb (carbohydrate), as told by your health care provider.  Some fast-acting carbs are:  Glucose tablets: take 3-4 tablets.  Hard candy: eat 3-5 pieces.  Fruit juice: drink 4 oz (120 mL).  Regular (not diet) soda: drink 4-6 oz (120-180 mL).  Honey or sugar: eat 1 Tbsp (15 mL).  Check your blood glucose levels 15 minutes after you take the carb.  If your glucose is still at or below 70 mg/dL (3.9 mmol/L), take 15 grams of a carb again.  If your glucose does not go above 70 mg/dL (3.9 mmol/L) after 3 tries, get help right away.  After your glucose goes back to normal, eat a meal or a snack within 1 hour.  Treating very low blood glucose  If your glucose is at or below 54 mg/dL (3 mmol/L), you have very low blood glucose (severe hypoglycemia).  This is an emergency. Do not wait to see if the symptoms will go away. Get medical help right away. Call your local emergency services (911 in the [place] Do not drive yourself to the hospital.  Questions to ask your health care provider  Should I talk with a diabetes educator?  What equipment will I need to care for myself at home?  What diabetes medicines do I need? When should I take them?  How often do I need to check my blood glucose levels?  What number can I call if I have questions?  When is my follow-up visit?  Where can I find a support group for people with diabetes?  Where to find more information  American Diabetes Association: www.diabetes.org  Association of Diabetes Care and Education Specialists: www.diabeteseducator.org  Contact a health care provider if:  Your blood glucose is at or above 240 mg/dL (13.3 mmol/L) for 2 days in a row.  You have been sick or have had a fever for 2 days or more, and you are not getting better.  You have any of these problems for more than 6 hours:  You cannot eat or  drink.  You feel nauseous.  You vomit.  You have diarrhea.  Get help right away if:  Your blood glucose is lower than 54 mg/dL (3 mmol/L).  You get confused.  You have trouble thinking clearly.  You have trouble breathing.  These symptoms may represent a serious problem that is an emergency. Do not wait to see if the symptoms will go away. Get medical help right away. Call your local emergency services (911 in the [place] Do not drive yourself to the hospital.  Summary  Diabetes mellitus is a chronic disease that occurs when the body does not properly use sugar (glucose) that is released from food after you eat.  Take insulin and diabetes medicines as told.  Check your blood glucose every day, as often as told.  Keep all follow-up visits. This is important.  This information is not intended to replace advice given to you by your health care provider. Make sure you discuss any questions you have with your health care provider.  Document Revised: 04/20/2021 Document Reviewed: 04/20/2021  [employer] Patient Education © 2024 [employer]

## 2024-04-19 DIAGNOSIS — Z79.4 TYPE 2 DIABETES MELLITUS WITH HYPERGLYCEMIA, WITH LONG-TERM CURRENT USE OF INSULIN: Primary | ICD-10-CM

## 2024-04-19 DIAGNOSIS — I10 ESSENTIAL HYPERTENSION: ICD-10-CM

## 2024-04-19 DIAGNOSIS — E11.65 TYPE 2 DIABETES MELLITUS WITH HYPERGLYCEMIA, WITH LONG-TERM CURRENT USE OF INSULIN: Primary | ICD-10-CM

## 2024-04-19 DIAGNOSIS — E78.2 HYPERLIPIDEMIA, MIXED: ICD-10-CM

## 2024-04-19 LAB
ALBUMIN SERPL-MCNC: 4.4 G/DL (ref 3.5–5.2)
ALBUMIN/GLOB SERPL: 2 G/DL
ALP SERPL-CCNC: 69 U/L (ref 39–117)
ALT SERPL-CCNC: 26 U/L (ref 1–41)
AST SERPL-CCNC: 23 U/L (ref 1–40)
BILIRUB SERPL-MCNC: 2.6 MG/DL (ref 0–1.2)
BUN SERPL-MCNC: 10 MG/DL (ref 6–20)
BUN/CREAT SERPL: 11.1 (ref 7–25)
CALCIUM SERPL-MCNC: 9.8 MG/DL (ref 8.6–10.5)
CHLORIDE SERPL-SCNC: 104 MMOL/L (ref 98–107)
CO2 SERPL-SCNC: 23.7 MMOL/L (ref 22–29)
CREAT SERPL-MCNC: 0.9 MG/DL (ref 0.76–1.27)
EGFRCR SERPLBLD CKD-EPI 2021: 102.8 ML/MIN/1.73
GLOBULIN SER CALC-MCNC: 2.2 GM/DL
GLUCOSE SERPL-MCNC: 126 MG/DL (ref 65–99)
POTASSIUM SERPL-SCNC: 4.1 MMOL/L (ref 3.5–5.2)
PROT SERPL-MCNC: 6.6 G/DL (ref 6–8.5)
SODIUM SERPL-SCNC: 141 MMOL/L (ref 136–145)

## 2024-04-23 RX ORDER — GLIMEPIRIDE 2 MG/1
2 TABLET ORAL 2 TIMES DAILY WITH MEALS
Qty: 180 TABLET | Refills: 1 | Status: SHIPPED | OUTPATIENT
Start: 2024-04-23

## 2024-04-25 ENCOUNTER — OFFICE VISIT (OUTPATIENT)
Dept: CARDIOLOGY | Facility: CLINIC | Age: 54
End: 2024-04-25
Payer: COMMERCIAL

## 2024-04-25 VITALS
WEIGHT: 234 LBS | SYSTOLIC BLOOD PRESSURE: 138 MMHG | BODY MASS INDEX: 32.76 KG/M2 | HEIGHT: 71 IN | HEART RATE: 78 BPM | DIASTOLIC BLOOD PRESSURE: 84 MMHG

## 2024-04-25 DIAGNOSIS — I10 ESSENTIAL HYPERTENSION: Primary | ICD-10-CM

## 2024-04-25 NOTE — PROGRESS NOTES
"      CARDIOLOGY    Martínez Barahona MD    ENCOUNTER DATE:  04/25/2024    Juan M Collins / 54 y.o. / male        CHIEF COMPLAINT / REASON FOR OFFICE VISIT     Abnormal ECG and Shortness of Breath      HISTORY OF PRESENT ILLNESS       Shortness of Breath      Juan M Collins is a 54 y.o. male who presents today for consultation.  Patient has a history of hypertension hyperlipidemia and had an abnormal ECG.  Patient's ECG dating back to 1/26/2022 when he was at [facility].  During that admission he was septic from acute cholecystitis.  He has subsequently recovered and has not had any further issues.  He denies any types of cardiac issues such as palpitation shortness of breath edema lightheadedness chest pain or fatigue.  He remains active and does not have any limitations.      The following portions of the patient's history were reviewed and updated as appropriate: allergies, current medications, past family history, past medical history, past social history, past surgical history and problem list.      VITAL SIGNS     Visit Vitals  /84 (BP Location: Left arm)   Pulse 78   Ht 180.3 cm (71\")   Wt 106 kg (234 lb)   BMI 32.64 kg/m²         Wt Readings from Last 3 Encounters:   04/25/24 106 kg (234 lb)   04/18/24 105 kg (232 lb)   03/25/24 105 kg (232 lb)     Body mass index is 32.64 kg/m².      REVIEW OF SYSTEMS   Review of Systems   Respiratory:  Positive for shortness of breath.            PHYSICAL EXAMINATION     Vitals reviewed.   Constitutional:       Appearance: Healthy appearance.   Pulmonary:      Effort: Pulmonary effort is normal.   Cardiovascular:      Normal rate. Regular rhythm. Normal S1. Normal S2.       Murmurs: There is no murmur.      No gallop.  No click. No rub.   Pulses:     Intact distal pulses.   Edema:     Peripheral edema absent.   Neurological:      Mental Status: Alert.           REVIEWED DATA       ECG 12 Lead    Date/Time: 4/25/2024 12:17 PM  Performed by: Martínez Barahona " MD YOSELIN    Authorized by: Martínez Barahona MD  Comparison: compared with previous ECG from 1/26/2022  Comparison to previous ECG: Previous ECG showed an interventricular conduction delay poor R wave progression diffuse ST and T wave abnormalities.  Rhythm: sinus rhythm    Clinical impression: normal ECG          Cardiac Procedures:      Lipid Panel          3/25/2024    00:00   Lipid Panel   Total Cholesterol 175    Triglycerides 420    HDL Cholesterol 39    VLDL Cholesterol 66    LDL Cholesterol  70          ASSESSMENT & PLAN      Diagnosis Plan   1. Essential hypertension              SUMMARY/DISCUSSION  Hypertension blood pressures good  Abnormal ECG.  We repeated his ECG today and his ECG has normalized.  I think the abnormalities on his previous ECG was due to his extreme illness and sepsis.  With subsequent resolution all of his ECG findings have normalized.  In light of that no further workup is necessary at this time patient was told to come back on an as-needed basis.        MEDICATIONS         Discharge Medications            Accurate as of April 25, 2024 12:15 PM. If you have any questions, ask your nurse or doctor.                Changes to Medications        Instructions Start Date   amLODIPine 2.5 MG tablet  Commonly known as: NORVASC  What changed: how much to take   2.5 mg, Oral, Daily, For BP with lisinopril             Continue These Medications        Instructions Start Date   Accu-Chek Guide w/Device kit       glimepiride 2 MG tablet  Commonly known as: Amaryl   2 mg, Oral, 2 Times Daily With Meals, For uncontrolled type 2 diabetes      glucose blood test strip   Check glucose daily and PRN  for E11.65      glucose monitor monitoring kit   1 each, Does not apply, Daily, Test glucose daily and PRN for DMII E11.65      lisinopril 20 MG tablet  Commonly known as: PRINIVIL,ZESTRIL   20 mg, Oral, Daily, For BP, new dose      metFORMIN  MG 24 hr tablet  Commonly known as: GLUCOPHAGE-XR   2 p.o.  daily with food for type 2 diabetes      Ozempic (0.25 or 0.5 MG/DOSE) 2 MG/1.5ML solution pen-injector  Generic drug: Semaglutide(0.25 or 0.5MG/DOS)   Inject 0.5mg subcutaneously once weekly for DMII      rosuvastatin 10 MG tablet  Commonly known as: Crestor   10 mg, Oral, Daily, For cholesterol                   **Dragon Disclaimer:   Much of this encounter note is an electronic transcription/translation of spoken language to printed text. The electronic translation of spoken language may permit erroneous, or at times, nonsensical words or phrases to be inadvertently transcribed. Although I have reviewed the note for such errors, some may still exist.

## 2024-04-30 RX ORDER — SEMAGLUTIDE 0.68 MG/ML
0.5 INJECTION, SOLUTION SUBCUTANEOUS WEEKLY
Qty: 2 ML | Refills: 5 | Status: SHIPPED | OUTPATIENT
Start: 2024-04-30

## 2024-05-05 RX ORDER — AMLODIPINE BESYLATE 5 MG/1
5 TABLET ORAL DAILY
Qty: 90 TABLET | Refills: 1 | Status: SHIPPED | OUTPATIENT
Start: 2024-05-05

## 2024-06-27 ENCOUNTER — OFFICE VISIT (OUTPATIENT)
Dept: FAMILY MEDICINE CLINIC | Facility: CLINIC | Age: 54
End: 2024-06-27
Payer: COMMERCIAL

## 2024-06-27 VITALS
BODY MASS INDEX: 32.76 KG/M2 | WEIGHT: 234 LBS | SYSTOLIC BLOOD PRESSURE: 120 MMHG | RESPIRATION RATE: 16 BRPM | HEART RATE: 92 BPM | TEMPERATURE: 97.3 F | HEIGHT: 71 IN | DIASTOLIC BLOOD PRESSURE: 83 MMHG

## 2024-06-27 DIAGNOSIS — Z79.4 TYPE 2 DIABETES MELLITUS WITH HYPERGLYCEMIA, WITH LONG-TERM CURRENT USE OF INSULIN: Primary | ICD-10-CM

## 2024-06-27 DIAGNOSIS — E11.65 TYPE 2 DIABETES MELLITUS WITH HYPERGLYCEMIA, WITH LONG-TERM CURRENT USE OF INSULIN: Primary | ICD-10-CM

## 2024-06-27 DIAGNOSIS — E78.2 HYPERLIPIDEMIA, MIXED: ICD-10-CM

## 2024-06-27 DIAGNOSIS — I10 ESSENTIAL HYPERTENSION: ICD-10-CM

## 2024-06-27 DIAGNOSIS — E55.9 VITAMIN D DEFICIENCY: ICD-10-CM

## 2024-06-27 PROCEDURE — 99214 OFFICE O/P EST MOD 30 MIN: CPT | Performed by: PHYSICIAN ASSISTANT

## 2024-06-27 NOTE — PROGRESS NOTES
"Subjective   Juan M Collins is a 54 y.o. male.     Diabetes  Pertinent negatives for hypoglycemia include no confusion, speech difficulty or tremors. Pertinent negatives for diabetes include no blurred vision, no polydipsia, no polyuria and no weight loss.       Since the last visit, he has overall felt well.  He has Primary Hypertension and well controlled on current medication, Vitamin D deficiency and labs are at goal >30 ng/mL, and mixed hyperlipidemia will make sure LDL is less than 100 he is taking the generic Crestor.  Has type 2 diabetes uncontrolled updating A1c today and will make sure goal is met of less than 7% .  he has been compliant with current medications have reviewed them.  The patient denies medication side effects.  Will refill medications. /76   Pulse 92   Temp 97.3 °F (36.3 °C)   Resp 16   Ht 180.3 cm (71\")   Wt 106 kg (234 lb)   BMI 32.64 kg/m² .      Had eye exam end Dec  This is 3rd dose Ozempic  Lowest glucose 98----watching   Ave fasting glucose 149.  Results for orders placed or performed in visit on 04/18/24   Comprehensive Metabolic Panel    Specimen: Blood   Result Value Ref Range    Glucose 126 (H) 65 - 99 mg/dL    BUN 10 6 - 20 mg/dL    Creatinine 0.90 0.76 - 1.27 mg/dL    EGFR Result 102.8 >60.0 mL/min/1.73    BUN/Creatinine Ratio 11.1 7.0 - 25.0    Sodium 141 136 - 145 mmol/L    Potassium 4.1 3.5 - 5.2 mmol/L    Chloride 104 98 - 107 mmol/L    Total CO2 23.7 22.0 - 29.0 mmol/L    Calcium 9.8 8.6 - 10.5 mg/dL    Total Protein 6.6 6.0 - 8.5 g/dL    Albumin 4.4 3.5 - 5.2 g/dL    Globulin 2.2 gm/dL    A/G Ratio 2.0 g/dL    Total Bilirubin 2.6 (H) 0.0 - 1.2 mg/dL    Alkaline Phosphatase 69 39 - 117 U/L    AST (SGOT) 23 1 - 40 U/L    ALT (SGPT) 26 1 - 41 U/L   Urine microalbumin creatinine ratio on 3/25/2024 was 6 and this is normal.  I do have follow-up labs have restarted him on several medications and need to make sure hemoglobin A1c has moved below 9.  Last A1c on " 3/25/2024 was 11.7.  I also restarted him on his statin and restarted blood pressure medication and will make sure goals are met.  Does exercise  Has seen Dr Jaci BARNES 4-1-21 for indirect bilirubin;  [facility]  Pulse rate high and not taking Toprol and has been on lisinopril 20 mg daily for hypertension  CXR neg 3-25-24--was SOA and this has resolved  Saw DR Barahona for SOA and prior abn EKG 2022, saw him on 4-25-24  Authorized by: Martínez Barahona MD  Comparison: compared with previous ECG from 1/26/2022  Comparison to previous ECG: Previous ECG showed an interventricular conduction delay poor R wave progression diffuse ST and T wave abnormalities.  Rhythm: sinus rhythm     ClinicSUMMARY/DISCUSSION  Hypertension blood pressures good  Abnormal ECG.  We repeated his ECG today and his ECG has normalized.  I think the abnormalities on his previous ECG was due to his extreme illness and sepsis.  With subsequent resolution all of his ECG findings have normalized.  In light of that no further workup is necessary at this time patient was told to come back on an as-needed basis.al impression: normal ECG     The following portions of the patient's history were reviewed and updated as appropriate: allergies, current medications, past family history, past medical history, past social history, past surgical history, and problem list.    Review of Systems   Constitutional:  Negative for unexpected weight loss.   Eyes:  Negative for blurred vision.   Endocrine: Negative for polydipsia and polyuria.   Neurological:  Negative for tremors, speech difficulty and confusion.       Objective   Physical Exam  Vitals and nursing note reviewed.   Constitutional:       General: He is not in acute distress.     Appearance: Normal appearance. He is well-developed. He is not diaphoretic.   HENT:      Head: Normocephalic.      Right Ear: External ear normal.      Left Ear: External ear normal.   Eyes:      General: No scleral icterus.      Conjunctiva/sclera: Conjunctivae normal.      Pupils: Pupils are equal, round, and reactive to light.   Neck:      Vascular: No carotid bruit.   Cardiovascular:      Rate and Rhythm: Normal rate and regular rhythm.      Pulses: Normal pulses.      Heart sounds: Normal heart sounds. No murmur heard.  Pulmonary:      Effort: Pulmonary effort is normal.      Breath sounds: Normal breath sounds. No rales.   Musculoskeletal:         General: Normal range of motion.      Cervical back: Normal range of motion and neck supple.      Right lower leg: No edema.      Left lower leg: No edema.   Skin:     General: Skin is warm and dry.      Findings: No rash.   Neurological:      Mental Status: He is alert and oriented to person, place, and time.   Psychiatric:         Mood and Affect: Mood normal. Affect is not inappropriate.         Behavior: Behavior normal.         Thought Content: Thought content normal.         Judgment: Judgment normal.           Assessment & Plan   Diagnoses and all orders for this visit:    1. Type 2 diabetes mellitus with hyperglycemia, with long-term current use of insulin (Primary)    2. Essential hypertension    3. Vitamin D deficiency    4. Hyperlipidemia, mixed      Plan, Juan M Collins, was seen today.  he was seen for HTN and continue medication, DMII stable and refilled medications, Hyperlipidemia and will continue current medication, and Vitamin D deficiency and supplemented.  Continue to watch glucose as he continues on Ozempic--- he is to message me if his fasting glucose is running below 80 and I will reduce the glimepiride dosing  Labs today  His home blood pressure machine measures his diastolic just a bit high he got 90 diastolic when I got 83           Answers submitted by the patient for this visit:  Primary Reason for Visit (Submitted on 6/20/2024)  What is the primary reason for your visit?: Diabetes  Diabetes Questionnaire (Submitted on 6/20/2024)  Chief Complaint: Diabetes  problem  Below 70: never

## 2024-06-28 DIAGNOSIS — E55.9 VITAMIN D DEFICIENCY: ICD-10-CM

## 2024-06-28 DIAGNOSIS — Z79.4 TYPE 2 DIABETES MELLITUS WITHOUT COMPLICATION, WITH LONG-TERM CURRENT USE OF INSULIN: ICD-10-CM

## 2024-06-28 DIAGNOSIS — E78.2 HYPERLIPIDEMIA, MIXED: ICD-10-CM

## 2024-06-28 DIAGNOSIS — I10 ESSENTIAL HYPERTENSION: Primary | ICD-10-CM

## 2024-06-28 DIAGNOSIS — E11.9 TYPE 2 DIABETES MELLITUS WITHOUT COMPLICATION, WITH LONG-TERM CURRENT USE OF INSULIN: ICD-10-CM

## 2024-09-19 ENCOUNTER — OFFICE VISIT (OUTPATIENT)
Dept: FAMILY MEDICINE CLINIC | Facility: CLINIC | Age: 54
End: 2024-09-19
Payer: COMMERCIAL

## 2024-09-19 VITALS
HEIGHT: 71 IN | DIASTOLIC BLOOD PRESSURE: 86 MMHG | TEMPERATURE: 97.7 F | WEIGHT: 240.6 LBS | BODY MASS INDEX: 33.68 KG/M2 | SYSTOLIC BLOOD PRESSURE: 120 MMHG | OXYGEN SATURATION: 97 % | HEART RATE: 80 BPM

## 2024-09-19 DIAGNOSIS — E11.65 TYPE 2 DIABETES MELLITUS WITH HYPERGLYCEMIA, WITH LONG-TERM CURRENT USE OF INSULIN: ICD-10-CM

## 2024-09-19 DIAGNOSIS — Z79.4 TYPE 2 DIABETES MELLITUS WITH HYPERGLYCEMIA, WITH LONG-TERM CURRENT USE OF INSULIN: ICD-10-CM

## 2024-09-19 DIAGNOSIS — I10 ESSENTIAL HYPERTENSION: Primary | ICD-10-CM

## 2024-09-19 DIAGNOSIS — E78.2 HYPERLIPIDEMIA, MIXED: ICD-10-CM

## 2024-09-19 PROCEDURE — 99214 OFFICE O/P EST MOD 30 MIN: CPT | Performed by: PHYSICIAN ASSISTANT

## 2024-09-19 RX ORDER — SEMAGLUTIDE 0.68 MG/ML
0.5 INJECTION, SOLUTION SUBCUTANEOUS WEEKLY
Qty: 6 ML | Refills: 3 | Status: SHIPPED | OUTPATIENT
Start: 2024-09-19

## 2024-09-19 RX ORDER — LISINOPRIL 40 MG/1
40 TABLET ORAL DAILY
Qty: 90 TABLET | Refills: 1 | Status: SHIPPED | OUTPATIENT
Start: 2024-09-19

## 2024-09-19 RX ORDER — AMLODIPINE BESYLATE 5 MG/1
5 TABLET ORAL DAILY
Qty: 90 TABLET | Refills: 1 | Status: SHIPPED | OUTPATIENT
Start: 2024-09-19

## 2024-09-19 RX ORDER — GLIMEPIRIDE 2 MG/1
2 TABLET ORAL DAILY
Qty: 90 TABLET | Refills: 1 | Status: SHIPPED | OUTPATIENT
Start: 2024-09-19

## 2024-09-19 RX ORDER — METFORMIN HCL 500 MG
TABLET, EXTENDED RELEASE 24 HR ORAL
Qty: 180 TABLET | Refills: 1 | Status: SHIPPED | OUTPATIENT
Start: 2024-09-19

## 2024-11-01 RX ORDER — VALSARTAN 320 MG/1
320 TABLET ORAL DAILY
Qty: 90 TABLET | Refills: 1 | Status: SHIPPED | OUTPATIENT
Start: 2024-11-01

## 2024-12-15 ENCOUNTER — PATIENT MESSAGE (OUTPATIENT)
Dept: FAMILY MEDICINE CLINIC | Facility: CLINIC | Age: 54
End: 2024-12-15
Payer: COMMERCIAL

## 2024-12-15 RX ORDER — AMLODIPINE BESYLATE 10 MG/1
10 TABLET ORAL DAILY
Qty: 90 TABLET | Refills: 1 | Status: SHIPPED | OUTPATIENT
Start: 2024-12-15

## 2024-12-30 ENCOUNTER — OFFICE VISIT (OUTPATIENT)
Dept: FAMILY MEDICINE CLINIC | Facility: CLINIC | Age: 54
End: 2024-12-30
Payer: COMMERCIAL

## 2024-12-30 VITALS
RESPIRATION RATE: 16 BRPM | HEIGHT: 71 IN | OXYGEN SATURATION: 97 % | WEIGHT: 242 LBS | DIASTOLIC BLOOD PRESSURE: 78 MMHG | TEMPERATURE: 97.6 F | BODY MASS INDEX: 33.88 KG/M2 | HEART RATE: 87 BPM | SYSTOLIC BLOOD PRESSURE: 102 MMHG

## 2024-12-30 DIAGNOSIS — Z12.11 SCREEN FOR COLON CANCER: ICD-10-CM

## 2024-12-30 DIAGNOSIS — E11.65 TYPE 2 DIABETES MELLITUS WITH HYPERGLYCEMIA, WITH LONG-TERM CURRENT USE OF INSULIN: Primary | ICD-10-CM

## 2024-12-30 DIAGNOSIS — E55.9 VITAMIN D DEFICIENCY: ICD-10-CM

## 2024-12-30 DIAGNOSIS — Z79.4 TYPE 2 DIABETES MELLITUS WITH HYPERGLYCEMIA, WITH LONG-TERM CURRENT USE OF INSULIN: Primary | ICD-10-CM

## 2024-12-30 DIAGNOSIS — I10 ESSENTIAL HYPERTENSION: ICD-10-CM

## 2024-12-30 DIAGNOSIS — E78.2 HYPERLIPIDEMIA, MIXED: ICD-10-CM

## 2024-12-30 PROCEDURE — 99214 OFFICE O/P EST MOD 30 MIN: CPT | Performed by: PHYSICIAN ASSISTANT

## 2024-12-30 RX ORDER — ROSUVASTATIN CALCIUM 10 MG/1
10 TABLET, COATED ORAL DAILY
Qty: 90 TABLET | Refills: 3 | Status: SHIPPED | OUTPATIENT
Start: 2024-12-30

## 2024-12-30 RX ORDER — METFORMIN HYDROCHLORIDE 500 MG/1
1500 TABLET, EXTENDED RELEASE ORAL
Qty: 180 TABLET | Refills: 1 | Status: SHIPPED | OUTPATIENT
Start: 2024-12-30

## 2024-12-30 RX ORDER — GLIMEPIRIDE 1 MG/1
1 TABLET ORAL
Qty: 90 TABLET | Refills: 1 | Status: SHIPPED | OUTPATIENT
Start: 2024-12-30

## 2024-12-30 NOTE — PROGRESS NOTES
Subjective   Juan M Collins is a 54 y.o. male.     History of Present Illness  The patient presents for evaluation of hypertension, diabetes mellitus, and hypercholesterolemia.    He has been monitoring his blood pressure at home, which has consistently remained in the low 80s. He is reluctant to increase his medication dosage unless his blood pressure approaches 90. His blood pressure reading during this visit was 78.    He is scheduled to meet with his healthcare team to establish a management plan, which will include dietary modifications. He has been on leave from work for the past few weeks, during which time he has not been able to engage in his usual physical activity of playing pickleball. His blood glucose levels have been averaging in the 120s, with one instance of an unusually high reading earlier this week. He has not experienced any weight loss since starting Ozempic. He is on Ozempic, glimepiride, and metformin.  I do want to increase his metformin from 2 tabs daily to 3 tabs daily to better utilize control of his blood sugar and make sure he does not have any GI side effects and can even go to for daily I am concerned that he may develop hypoglycemia on the glimepiride and therefore I want to decrease dose down to 1 mg daily by taking half a tab of the 2 mg and I am hoping that we can discontinue that medication that can also contribute to weight gain.... He is to monitor his blood sugars if he is getting outside is 100-1 20 range could also increase his Ozempic dose  He is currently out of his cholesterol medication and is seeking a refill.    MEDICATIONS  Current: Amlodipine, Ozempic, glimepiride, metformin.       The following portions of the patient's history were reviewed and updated as appropriate: allergies, current medications, past family history, past medical history, past social history, past surgical history, and problem list.    Review of Systems   Constitutional:  Negative for  unexpected weight loss.   Eyes:  Negative for blurred vision.   Endocrine: Negative for polydipsia and polyuria.   Neurological:  Negative for tremors, speech difficulty and confusion.       Objective   Physical Exam  Vitals and nursing note reviewed.   Constitutional:       General: He is not in acute distress.     Appearance: He is well-developed. He is not diaphoretic.   HENT:      Head: Normocephalic.   Eyes:      Conjunctiva/sclera: Conjunctivae normal.      Pupils: Pupils are equal, round, and reactive to light.   Cardiovascular:      Rate and Rhythm: Normal rate and regular rhythm.      Pulses: Normal pulses.      Heart sounds: Normal heart sounds. No murmur heard.  Pulmonary:      Effort: Pulmonary effort is normal.      Breath sounds: Normal breath sounds.   Musculoskeletal:         General: Normal range of motion.      Cervical back: Normal range of motion and neck supple.   Skin:     General: Skin is warm and dry.      Findings: No rash.   Neurological:      Mental Status: He is alert and oriented to person, place, and time.   Psychiatric:         Mood and Affect: Affect is not inappropriate.         Behavior: Behavior normal.         Thought Content: Thought content normal.         Judgment: Judgment normal.         Physical Exam  Lungs are clear.  Heart was examined.    Vital Signs  Blood pressure reading is 102/78.       Results  Laboratory Studies  LDL cholesterol 46, triglycerides 181, total cholesterol 113, HDL 37. Liver enzymes were normal. Kidney functions were normal. Electrolytes were normal. Glucose 107. A1c 5.8%. Urine BUN creatinine ratio is 3.       Assessment & Plan   Diagnoses and all orders for this visit:    1. Type 2 diabetes mellitus with hyperglycemia, with long-term current use of insulin (Primary)    2. Essential hypertension    3. Hyperlipidemia, mixed    4. Vitamin D deficiency        Assessment & Plan  1. Hypertension.  Blood pressure readings have been consistently in the range  of 120s to 130s, with occasional elevations in diastolic pressure. The average blood pressure from December 1 to December 7 was 119/87. The current regimen includes amlodipine 10 mg and valsartan. He will continue with the current dosage of amlodipine 10 mg and valsartan. Refills for amlodipine 10 mg have been provided.    2. Diabetes Mellitus.  A1c level has decreased from 6.4 to 5.8%, indicating good control. He is currently on Ozempic, glimepiride, and metformin. The glimepiride dosage will be reduced to 1 mg daily to mitigate the risk of hypoglycemia. He is advised to monitor his blood glucose levels closely. The metformin dosage will be increased to three times daily, taken with food. He is instructed to report any gastrointestinal side effects. Refills for semaglutide and metformin have been provided. If blood glucose levels remain between 100 to 120, the glimepiride dosage may be further reduced. If blood glucose levels increase, the Ozempic dosage may be increased while maintaining the lowest possible glimepiride dosage.    3. Hyperlipidemia.  LDL cholesterol goal has been achieved at 46 mg/dL, but triglycerides are mildly elevated at 181 mg/dL. He is advised to continue his current cholesterol medication regimen. A 90-day prescription for his cholesterol medication has been provided.    4. Health Maintenance.  A Cologuard test will be ordered for colon cancer screening. He is scheduled for a pneumonia vaccine update in December 2025. He is advised to continue monitoring his glucose, kidney function, liver function, electrolyte levels, and A1c every six months. A CBC, TSH, B12, folic acid, vitamin D, and PSA will be checked at the end of March 2025.   Had eye exam  Get weight down---more exercise           Patient or patient representative verbalized consent for the use of Ambient Listening during the visit with  Carisa Caballero PA-C for chart documentation. 12/30/2024  16:58 EST  Answers submitted by the  patient for this visit:  Primary Reason for Visit (Submitted on 12/23/2024)  What is the primary reason for your visit?: Diabetes  Diabetes Questionnaire (Submitted on 12/23/2024)  Chief Complaint: Diabetes problem  Diabetes type: type 2  MedicAlert ID: No  Disease duration: 3 Years  Treatment compliance: all of the time  Symptom course: stable  Home blood tests: 3-4 x per week  High score: 130-140  Below 70: never  foot paresthesias: No  foot ulcerations: No  headaches: No  sweats: No  Current diet: generally healthy  Meal planning: none  Exercise: three times a week  Eye exam current: Yes  Sees podiatrist: No

## 2024-12-30 NOTE — PATIENT INSTRUCTIONS
Diabetes Mellitus and Nutrition, Adult  When you have diabetes, or diabetes mellitus, it is very important to have healthy eating habits because your blood sugar (glucose) levels are greatly affected by what you eat and drink. Eating healthy foods in the right amounts, at about the same times every day, can help you:  Manage your blood glucose.  Lower your risk of heart disease.  Improve your blood pressure.  Reach or maintain a healthy weight.  What can affect my meal plan?  Every person with diabetes is different, and each person has different needs for a meal plan. Your health care provider may recommend that you work with a dietitian to make a meal plan that is best for you. Your meal plan may vary depending on factors such as:  The calories you need.  The medicines you take.  Your weight.  Your blood glucose, blood pressure, and cholesterol levels.  Your activity level.  Other health conditions you have, such as heart or kidney disease.  How do carbohydrates affect me?  Carbohydrates, also called carbs, affect your blood glucose level more than any other type of food. Eating carbs raises the amount of glucose in your blood.  It is important to know how many carbs you can safely have in each meal. This is different for every person. Your dietitian can help you calculate how many carbs you should have at each meal and for each snack.  How does alcohol affect me?  Alcohol can cause a decrease in blood glucose (hypoglycemia), especially if you use insulin or take certain diabetes medicines by mouth. Hypoglycemia can be a life-threatening condition. Symptoms of hypoglycemia, such as sleepiness, dizziness, and confusion, are similar to symptoms of having too much alcohol.  Do not drink alcohol if:  Your health care provider tells you not to drink.  You are pregnant, may be pregnant, or are planning to become pregnant.  If you drink alcohol:  Limit how much you have to:  0-1 drink a day for women.  0-2 drinks a day  "for men.  Know how much alcohol is in your drink. In the [place] one drink equals one 12 oz bottle of beer (355 mL), one 5 oz glass of wine (148 mL), or one 1½ oz glass of hard liquor (44 mL).  Keep yourself hydrated with water, diet soda, or unsweetened iced tea. Keep in mind that regular soda, juice, and other mixers may contain a lot of sugar and must be counted as carbs.  What are tips for following this plan?    Reading food labels  Start by checking the serving size on the Nutrition Facts label of packaged foods and drinks. The number of calories and the amount of carbs, fats, and other nutrients listed on the label are based on one serving of the item. Many items contain more than one serving per package.  Check the total grams (g) of carbs in one serving.  Check the number of grams of saturated fats and trans fats in one serving. Choose foods that have a low amount or none of these fats.  Check the number of milligrams (mg) of salt (sodium) in one serving. Most people should limit total sodium intake to less than 2,300 mg per day.  Always check the nutrition information of foods labeled as \"low-fat\" or \"nonfat.\" These foods may be higher in added sugar or refined carbs and should be avoided.  Talk to your dietitian to identify your daily goals for nutrients listed on the label.  Shopping  Avoid buying canned, pre-made, or processed foods. These foods tend to be high in fat, sodium, and added sugar.  Shop around the outside edge of the grocery store. This is where you will most often find fresh fruits and vegetables, bulk grains, fresh meats, and fresh dairy products.  Cooking  Use low-heat cooking methods, such as baking, instead of high-heat cooking methods, such as deep frying.  Cook using healthy oils, such as olive, canola, or sunflower oil.  Avoid cooking with butter, cream, or high-fat meats.  Meal planning  Eat meals and snacks regularly, preferably at the same times every day. Avoid going long periods " of time without eating.  Eat foods that are high in fiber, such as fresh fruits, vegetables, beans, and whole grains.  Eat 4-6 oz (112-168 g) of lean protein each day, such as lean meat, chicken, fish, eggs, or tofu. One ounce (oz) (28 g) of lean protein is equal to:  1 oz (28 g) of meat, chicken, or fish.  1 egg.  ¼ cup (62 g) of tofu.  Eat some foods each day that contain healthy fats, such as avocado, nuts, seeds, and fish.  What foods should I eat?  Fruits  Berries. Apples. Oranges. Peaches. Apricots. Plums. Grapes. Mangoes. Papayas. Pomegranates. Kiwi. Cherries.  Vegetables  Leafy greens, including lettuce, spinach, kale, chard, michelle greens, mustard greens, and cabbage. Beets. Cauliflower. Broccoli. Carrots. Green beans. Tomatoes. Peppers. Onions. Cucumbers. [place] sprouts.  Grains  Whole grains, such as whole-wheat or whole-grain bread, crackers, tortillas, cereal, and pasta. Unsweetened oatmeal. Quinoa. Brown or wild rice.  Meats and other proteins  Seafood. Poultry without skin. Lean cuts of poultry and beef. Tofu. Nuts. Seeds.  Dairy  Low-fat or fat-free dairy products such as milk, yogurt, and cheese.  The items listed above may not be a complete list of foods and beverages you can eat and drink. Contact a dietitian for more information.  What foods should I avoid?  Fruits  Fruits canned with syrup.  Vegetables  Canned vegetables. Frozen vegetables with butter or cream sauce.  Grains  Refined white flour and flour products such as bread, pasta, snack foods, and cereals. Avoid all processed foods.  Meats and other proteins  Fatty cuts of meat. Poultry with skin. Breaded or fried meats. Processed meat. Avoid saturated fats.  Dairy  Full-fat yogurt, cheese, or milk.  Beverages  Sweetened drinks, such as soda or iced tea.  The items listed above may not be a complete list of foods and beverages you should avoid. Contact a dietitian for more information.  Questions to ask a health care provider  Do I need  to meet with a certified diabetes care and ?  Do I need to meet with a dietitian?  What number can I call if I have questions?  When are the best times to check my blood glucose?  Where to find more information:  American Diabetes Association: diabetes.org  Academy of Nutrition and Dietetics: eatright.org  National [place] of Diabetes and Digestive and Kidney Diseases: niddk.nih.gov  Association of Diabetes Care & Education Specialists: diabeteseducator.org  Summary  It is important to have healthy eating habits because your blood sugar (glucose) levels are greatly affected by what you eat and drink. It is important to use alcohol carefully.  A healthy meal plan will help you manage your blood glucose and lower your risk of heart disease.  Your health care provider may recommend that you work with a dietitian to make a meal plan that is best for you.  This information is not intended to replace advice given to you by your health care provider. Make sure you discuss any questions you have with your health care provider.  Document Revised: 07/21/2021 Document Reviewed: 07/21/2021  Elsevier Patient Education © 2024 [employer]

## 2025-03-31 RX ORDER — AMLODIPINE BESYLATE 10 MG/1
5 TABLET ORAL DAILY
Start: 2025-03-31

## 2025-05-19 ENCOUNTER — PATIENT MESSAGE (OUTPATIENT)
Dept: FAMILY MEDICINE CLINIC | Facility: CLINIC | Age: 55
End: 2025-05-19
Payer: COMMERCIAL

## 2025-05-19 DIAGNOSIS — E78.2 HYPERLIPIDEMIA, MIXED: ICD-10-CM

## 2025-05-19 DIAGNOSIS — E55.9 VITAMIN D DEFICIENCY: ICD-10-CM

## 2025-05-19 DIAGNOSIS — Z79.4 TYPE 2 DIABETES MELLITUS WITH HYPERGLYCEMIA, WITH LONG-TERM CURRENT USE OF INSULIN: ICD-10-CM

## 2025-05-19 DIAGNOSIS — I10 ESSENTIAL HYPERTENSION: Primary | ICD-10-CM

## 2025-05-19 DIAGNOSIS — Z12.5 SCREENING PSA (PROSTATE SPECIFIC ANTIGEN): ICD-10-CM

## 2025-05-19 DIAGNOSIS — E11.65 TYPE 2 DIABETES MELLITUS WITH HYPERGLYCEMIA, WITH LONG-TERM CURRENT USE OF INSULIN: ICD-10-CM

## 2025-05-21 RX ORDER — VALSARTAN 320 MG/1
TABLET ORAL
Qty: 90 TABLET | Refills: 0 | Status: SHIPPED | OUTPATIENT
Start: 2025-05-21

## 2025-05-30 LAB
25(OH)D3+25(OH)D2 SERPL-MCNC: 59 NG/ML (ref 30–100)
BASOPHILS # BLD AUTO: 0 X10E3/UL (ref 0–0.2)
BASOPHILS NFR BLD AUTO: 1 %
EOSINOPHIL # BLD AUTO: 0.1 X10E3/UL (ref 0–0.4)
EOSINOPHIL NFR BLD AUTO: 2 %
ERYTHROCYTE [DISTWIDTH] IN BLOOD BY AUTOMATED COUNT: 12.6 % (ref 11.6–15.4)
FOLATE SERPL-MCNC: >20 NG/ML
HCT VFR BLD AUTO: 49.6 % (ref 37.5–51)
HGB BLD-MCNC: 15.6 G/DL (ref 13–17.7)
IMM GRANULOCYTES # BLD AUTO: 0 X10E3/UL (ref 0–0.1)
IMM GRANULOCYTES NFR BLD AUTO: 0 %
LYMPHOCYTES # BLD AUTO: 2.5 X10E3/UL (ref 0.7–3.1)
LYMPHOCYTES NFR BLD AUTO: 39 %
MCH RBC QN AUTO: 30.5 PG (ref 26.6–33)
MCHC RBC AUTO-ENTMCNC: 31.5 G/DL (ref 31.5–35.7)
MCV RBC AUTO: 97 FL (ref 79–97)
MONOCYTES # BLD AUTO: 0.5 X10E3/UL (ref 0.1–0.9)
MONOCYTES NFR BLD AUTO: 7 %
NEUTROPHILS # BLD AUTO: 3.4 X10E3/UL (ref 1.4–7)
NEUTROPHILS NFR BLD AUTO: 51 %
PLATELET # BLD AUTO: 169 X10E3/UL (ref 150–450)
PSA SERPL-MCNC: 0.5 NG/ML (ref 0–4)
RBC # BLD AUTO: 5.11 X10E6/UL (ref 4.14–5.8)
T4 FREE SERPL-MCNC: 1.02 NG/DL (ref 0.82–1.77)
TSH SERPL DL<=0.005 MIU/L-ACNC: 3.67 UIU/ML (ref 0.45–4.5)
VIT B12 SERPL-MCNC: 505 PG/ML (ref 232–1245)
WBC # BLD AUTO: 6.5 X10E3/UL (ref 3.4–10.8)

## 2025-06-26 ENCOUNTER — OFFICE VISIT (OUTPATIENT)
Dept: FAMILY MEDICINE CLINIC | Facility: CLINIC | Age: 55
End: 2025-06-26
Payer: COMMERCIAL

## 2025-06-26 VITALS
HEIGHT: 71 IN | RESPIRATION RATE: 16 BRPM | SYSTOLIC BLOOD PRESSURE: 136 MMHG | WEIGHT: 206.5 LBS | DIASTOLIC BLOOD PRESSURE: 74 MMHG | HEART RATE: 92 BPM | TEMPERATURE: 97.2 F | OXYGEN SATURATION: 97 % | BODY MASS INDEX: 28.91 KG/M2

## 2025-06-26 DIAGNOSIS — E78.2 HYPERLIPIDEMIA, MIXED: ICD-10-CM

## 2025-06-26 DIAGNOSIS — E11.65 TYPE 2 DIABETES MELLITUS WITH HYPERGLYCEMIA, WITH LONG-TERM CURRENT USE OF INSULIN: Primary | ICD-10-CM

## 2025-06-26 DIAGNOSIS — I10 ESSENTIAL HYPERTENSION: ICD-10-CM

## 2025-06-26 DIAGNOSIS — E55.9 VITAMIN D DEFICIENCY: ICD-10-CM

## 2025-06-26 DIAGNOSIS — Z79.4 TYPE 2 DIABETES MELLITUS WITH HYPERGLYCEMIA, WITH LONG-TERM CURRENT USE OF INSULIN: Primary | ICD-10-CM

## 2025-06-26 RX ORDER — METFORMIN HYDROCHLORIDE 500 MG/1
1500 TABLET, EXTENDED RELEASE ORAL
Qty: 180 TABLET | Refills: 1 | Status: SHIPPED | OUTPATIENT
Start: 2025-06-26

## 2025-06-26 RX ORDER — VALSARTAN 320 MG/1
320 TABLET ORAL DAILY
Qty: 90 TABLET | Refills: 1 | Status: SHIPPED | OUTPATIENT
Start: 2025-06-26

## 2025-06-26 RX ORDER — AMLODIPINE BESYLATE 5 MG/1
5 TABLET ORAL DAILY
Qty: 90 TABLET | Refills: 1 | Status: SHIPPED | OUTPATIENT
Start: 2025-06-26

## 2025-06-26 NOTE — PROGRESS NOTES
"Subjective   Juan M Collins is a 55 y.o. male.     Hypertension  Associated symptoms: no blurred vision    Diabetes  Associated symptoms:     no blurred vision, no polydipsia and no polyuria    Hypoglycemia symptoms:     no confusion, no speech difficulty and no tremors        Since the last visit, he has overall felt well.  He has Primary Hypertension not at goal, plan to add/adjust medication, DMII well controlled on medication and will continue regimen, Hyperlipidemia with goals met with current Rx, and Vitamin D deficiency and labs are at goal >30 ng/mL.  he has been compliant with current medications have reviewed them.  The patient denies medication side effects.  Will refill medications. /74   Pulse 92   Temp 97.2 °F (36.2 °C) (Oral)   Resp 16   Ht 180.3 cm (71\")   Wt 93.7 kg (206 lb 8 oz)   SpO2 97%   BMI 28.80 kg/m² .        Weight down  Exercise----pickleball and ping-pong  Watch diet---keto    Hungry at night  Down 30 lbs  Home machine runs high--concern systolic <115    Results for orders placed or performed in visit on 05/19/25   T4, Free    Collection Time: 05/29/25  8:28 AM    Specimen: Blood   Result Value Ref Range    Free T4 1.02 0.82 - 1.77 ng/dL   TSH    Collection Time: 05/29/25  8:28 AM    Specimen: Blood   Result Value Ref Range    TSH 3.670 0.450 - 4.500 uIU/mL   PSA Screen    Collection Time: 05/29/25  8:28 AM    Specimen: Blood   Result Value Ref Range    PSA 0.5 0.0 - 4.0 ng/mL   Vitamin D,25-Hydroxy    Collection Time: 05/29/25  8:28 AM    Specimen: Blood   Result Value Ref Range    25 Hydroxy, Vitamin D 59.0 30.0 - 100.0 ng/mL   Vitamin B12    Collection Time: 05/29/25  8:28 AM    Specimen: Blood   Result Value Ref Range    Vitamin B-12 505 232 - 1,245 pg/mL   Folate    Collection Time: 05/29/25  8:28 AM    Specimen: Blood   Result Value Ref Range    Folate >20.0 >3.0 ng/mL   CBC & Differential    Collection Time: 05/29/25  8:28 AM    Specimen: Blood   Result Value Ref " Range    WBC 6.5 3.4 - 10.8 x10E3/uL    RBC 5.11 4.14 - 5.80 x10E6/uL    Hemoglobin 15.6 13.0 - 17.7 g/dL    Hematocrit 49.6 37.5 - 51.0 %    MCV 97 79 - 97 fL    MCH 30.5 26.6 - 33.0 pg    MCHC 31.5 31.5 - 35.7 g/dL    RDW 12.6 11.6 - 15.4 %    Platelets 169 150 - 450 x10E3/uL    Neutrophil Rel % 51 Not Estab. %    Lymphocyte Rel % 39 Not Estab. %    Monocyte Rel % 7 Not Estab. %    Eosinophil Rel % 2 Not Estab. %    Basophil Rel % 1 Not Estab. %    Neutrophils Absolute 3.4 1.4 - 7.0 x10E3/uL    Lymphocytes Absolute 2.5 0.7 - 3.1 x10E3/uL    Monocytes Absolute 0.5 0.1 - 0.9 x10E3/uL    Eosinophils Absolute 0.1 0.0 - 0.4 x10E3/uL    Basophils Absolute 0.0 0.0 - 0.2 x10E3/uL    Immature Granulocyte Rel % 0 Not Estab. %    Immature Grans Absolute 0.0 0.0 - 0.1 x10E3/uL   Also from 5/29/2024 glucose was 92, creatinine 0.95, EGFR 96, sodium 143, potassium 4.6, calcium 9.7 and the rest of the electrolytes were in normal range.  Total bilirubin 1.9, alk phos 61, AST 24, ALT 22  The microalbumin creatinine ratio was 6  Hemoglobin A1c was 5.1%  Lipids cholesterol 105, triglycerides 120, HDL 50, LDL 34  Ave /81 at home  Glucose  ave  Reviewed labs through his insurance collected on 5/29/2025 noting his CBC was in normal range with hemoglobin 15.6 and normal platelets 169, thyroid labs same day Free T41.02 And TSH 3.670, folic acid greater than 20, vitamin D at 59 and finally goal met.  PSA at 0.5  B12 505      Very pleased with hemoglobin A1c results and no microalbumin positive urine and lipids were at goal    Has seen Dr Jaci BARNES 4-1-21 for indirect bilirubin;  Jason's  Pulse rate high and not taking Toprol and has been on lisinopril 20 mg daily for hypertension  CXR neg 3-25-24--was SOA and this has resolved  Saw DR Barahona for SOA and prior abn EKG 2022, saw him on 4-25-24  Authorized by: Martínez Barahona MD  Comparison: compared with previous ECG from 1/26/2022  Comparison to previous ECG: Previous ECG  showed an interventricular conduction delay poor R wave progression diffuse ST and T wave abnormalities.  Rhythm: sinus rhythm     ClinicSUMMARY/DISCUSSION  Hypertension blood pressures good  Abnormal ECG.  We repeated his ECG today and his ECG has normalized.  I think the abnormalities on his previous ECG was due to his extreme illness and sepsis.  With subsequent resolution all of his ECG findings have normalized.  In light of that no further workup is necessary at this time patient was told to come back on an as-needed basis.al impression: normal ECG     The following portions of the patient's history were reviewed and updated as appropriate: allergies, current medications, past family history, past medical history, past social history, past surgical history, and problem list.    Review of Systems   Constitutional:  Negative for diaphoresis.   HENT:  Negative for nosebleeds and trouble swallowing.    Eyes:  Negative for blurred vision and visual disturbance.   Respiratory:  Negative for choking.    Gastrointestinal:  Negative for blood in stool.   Endocrine: Negative for polydipsia and polyuria.   Allergic/Immunologic: Negative for immunocompromised state.   Neurological:  Negative for tremors, facial asymmetry, speech difficulty and confusion.   Psychiatric/Behavioral:  Negative for self-injury and suicidal ideas.        Objective   Physical Exam  Vitals and nursing note reviewed.   Constitutional:       General: He is not in acute distress.     Appearance: He is well-developed. He is not diaphoretic.   HENT:      Head: Normocephalic.   Eyes:      Conjunctiva/sclera: Conjunctivae normal.      Pupils: Pupils are equal, round, and reactive to light.   Cardiovascular:      Rate and Rhythm: Normal rate and regular rhythm.      Heart sounds: Normal heart sounds. No murmur heard.  Pulmonary:      Effort: Pulmonary effort is normal.      Breath sounds: Normal breath sounds. No rales.   Musculoskeletal:         General:  Normal range of motion.      Cervical back: Normal range of motion and neck supple.      Right lower leg: Edema present.      Left lower leg: Edema present.      Comments: Trace pedal edema = bilat     Skin:     General: Skin is warm and dry.      Findings: No rash.   Neurological:      Mental Status: He is alert and oriented to person, place, and time.   Psychiatric:         Mood and Affect: Mood normal. Affect is not inappropriate.         Behavior: Behavior normal.         Thought Content: Thought content normal.         Judgment: Judgment normal.           Assessment & Plan   Diagnoses and all orders for this visit:    1. Type 2 diabetes mellitus with hyperglycemia, with long-term current use of insulin (Primary)    2. Essential hypertension    3. Vitamin D deficiency    4. Hyperlipidemia, mixed      Plan, Juan M Collins, was seen today.  he was seen for HTN and add/adjust medication, DMII stable and refilled medications, Hyperlipidemia and will continue current medication, and Vitamin D deficiency and supplemented.  Blood pressure is much improved with his excellent weight loss of over 30 pounds decrease dose amlodipine due to low blood pressure needs systolic at least 115 but less than 140.  He will monitor.  Continuing valsartan same dose.  Also will continue the metformin and Ozempic and tolerating well.  Continue diet and exercise  Plan follow-up labs and appointment in 6 months           Answers submitted by the patient for this visit:  Diabetes Questionnaire (Submitted on 6/19/2025)  Chief Complaint: Diabetes problem  Below 70: never

## 2025-08-21 ENCOUNTER — HOSPITAL ENCOUNTER (EMERGENCY)
Facility: HOSPITAL | Age: 55
Discharge: HOME | End: 2025-08-21
Attending: EMERGENCY MEDICINE
Payer: COMMERCIAL

## 2025-08-22 ENCOUNTER — PATIENT MESSAGE (OUTPATIENT)
Dept: FAMILY MEDICINE CLINIC | Facility: CLINIC | Age: 55
End: 2025-08-22
Payer: COMMERCIAL